# Patient Record
Sex: MALE | URBAN - METROPOLITAN AREA
[De-identification: names, ages, dates, MRNs, and addresses within clinical notes are randomized per-mention and may not be internally consistent; named-entity substitution may affect disease eponyms.]

---

## 2017-07-21 PROBLEM — Z00.00 ENCOUNTER FOR PREVENTIVE HEALTH EXAMINATION: Status: ACTIVE | Noted: 2017-07-21

## 2021-05-20 ENCOUNTER — EMERGENCY (EMERGENCY)
Facility: HOSPITAL | Age: 41
LOS: 1 days | Discharge: HOME | End: 2021-05-20
Attending: STUDENT IN AN ORGANIZED HEALTH CARE EDUCATION/TRAINING PROGRAM | Admitting: STUDENT IN AN ORGANIZED HEALTH CARE EDUCATION/TRAINING PROGRAM
Payer: MEDICAID

## 2021-05-20 VITALS
RESPIRATION RATE: 14 BRPM | HEART RATE: 89 BPM | SYSTOLIC BLOOD PRESSURE: 120 MMHG | DIASTOLIC BLOOD PRESSURE: 68 MMHG | TEMPERATURE: 98 F | OXYGEN SATURATION: 94 %

## 2021-05-20 VITALS
HEART RATE: 99 BPM | SYSTOLIC BLOOD PRESSURE: 136 MMHG | RESPIRATION RATE: 18 BRPM | TEMPERATURE: 98 F | OXYGEN SATURATION: 96 % | DIASTOLIC BLOOD PRESSURE: 86 MMHG

## 2021-05-20 PROCEDURE — 99284 EMERGENCY DEPT VISIT MOD MDM: CPT

## 2021-05-20 NOTE — ED PROVIDER NOTE - OBJECTIVE STATEMENT
40M presents to ED [facility] s/p 8mg narcan. Patient is inpatient [facility] for opoid detox on methadone. pt states he snuck in 3 bags of heroin while at a medical doctors appointment. Patient states he normally can snort much more heroin with less effect so he believes this was laced with fentanyl. pt c/o fatigue. no nausea. requesting food. no diff breathing. no si. no hi. no avh.

## 2021-05-20 NOTE — ED ADULT NURSE NOTE - CHIEF COMPLAINT QUOTE
pt brought in by ambulance from [facility] where he is being treated for substance abuse. was found to be lethargic but arouse able. pt admitted to using heroine. pt received 8mg of narcan IM. followed with one episode of vomiting. pt awake and alert in triage.

## 2021-05-20 NOTE — ED PROVIDER NOTE - NS ED MD DISPO DISCHARGE CCDA
Patient/Caregiver provided printed discharge information. Hatchet Flap Text: The defect edges were debeveled with a #15 scalpel blade.  Given the location of the defect, shape of the defect and the proximity to free margins a hatchet flap was deemed most appropriate.  Using a sterile surgical marker, an appropriate hatchet flap was drawn incorporating the defect and placing the expected incisions within the relaxed skin tension lines where possible.    The area thus outlined was incised deep to adipose tissue with a #15 scalpel blade.  The skin margins were undermined to an appropriate distance in all directions utilizing iris scissors.

## 2021-05-20 NOTE — ED ADULT NURSE NOTE - OBJECTIVE STATEMENT
Pt brought in by EMS received to 20A. Pt A&Ox4, ambulatory at baseline. Pt states that he used "3 bags of heroin around 7PM." Pt states that he had an overdose. As per triage note, pt received 8mg of narcan IM with EMS. Pt awake, slightly drowsy, calm and cooperative at this time. Pt states that he currently feels fine, without complaints. Respirations equal and unlabored, no acute distress noted. Pt sating between 90%-95% on room air. Denies chest pain, palpitations, SOB, fever, cough, chills, N/V/D, headaches, dizziness, recent sick contacts. Denies SI/HI, hallucinations. Cardiac monitor in place, NSR noted. Vital signs as noted, comfort measures provided, call bell within reach. Assessment ongoing.

## 2021-05-20 NOTE — ED PROVIDER NOTE - PATIENT PORTAL LINK FT
You can access the FollowMyHealth Patient Portal offered by [facility] by registering at the following website: [email or web address]. By joining [facility]’s FollowMyHealth portal, you will also be able to view your health information using other applications (apps) compatible with our system.

## 2021-05-20 NOTE — ED ADULT TRIAGE NOTE - CHIEF COMPLAINT QUOTE
pt brought in by ambulance from [facility] where he is being treated for substance abuse. was found to be lethargic but arouse able. pt admitted to using heroine. pt received 8mg of narcan IM. followed with one episode of vomiting. pt awake and alert in triage.

## 2021-05-20 NOTE — ED PROVIDER NOTE - PROGRESS NOTE DETAILS
Josue - pt reassessed. he is eating a food tray. he is not somnolent. SPO2 is fluctuating between 88% and 92%, so it is possible there is still opoid onboard. will observe for another hour. Josue - pt reassessed. he wants to go home. he feels well. spo2 93% to 97%. he is playing on his phone. will dc. social work consulted for dc planning.

## 2021-05-20 NOTE — ED PROVIDER NOTE - NSFOLLOWUPINSTRUCTIONS_ED_ALL_ED_FT
You were seen for heroin abuse.    Seek immediate medical assistance for any new or worsening symptoms.     Return to [facility] inpatient detox program.     Do not use heroin.

## 2021-05-20 NOTE — ED PROVIDER NOTE - CLINICAL SUMMARY MEDICAL DECISION MAKING FREE TEXT BOX
Josue - pt presents s/p narcan admin after snorting 3 bags of heroin. Patient was unconscious and awake after Narcan. vss. transiently hypoxic. pe unremarkable. will assess cxr, labs, observe 6 hours, reassess.

## 2021-05-20 NOTE — ED PROVIDER NOTE - ATTENDING CONTRIBUTION TO CARE
40M with pmh heroin abuse, bipolar, HTN presenting with heroin use s/p narcan. Patient found in [facility] unresponsive with EMS administering 8mg Narcan with patient waking up. Patient states he relapsed after sneaking in heroin today after going out for physical therapy. Denies any other drug use. Denies any acute complaints. Denies fever, chills, cp, sob, nausea, vomiting, diarrhea, dysuria, headache, weakness, numbness    GEN: NAD, awake, well appearing  HEENT: NCAT, MMM, normal conjunctiva, perrl  CHEST/LUNGS: Non-tachypneic, CTAB, bilateral breath sounds  CARDIAC: Non-tachycardic, s1s2, normal perfusion, no peripheral edema  ABDOMEN: Soft, NTND, No rebound/guarding  MSK: No joint tenderness, no gross deformity of extremities  SKIN: No rashes, no petechiae, no vesicles  NEURO: CN grossly intact, normal coordination, no focal motor or sensory deficits  PSYCH: Alert, appropriate, cooperative    Patient presenting s/p narcan reversal of heroin on board. Patient with benign exam, well appearing. Will observe given recent narcan administration for any respiratory depression. No signs of trauma. Exam unremarkable, vitals wnl.

## 2021-05-21 PROCEDURE — 71046 X-RAY EXAM CHEST 2 VIEWS: CPT | Mod: 26

## 2021-05-21 NOTE — PROVIDER CONTACT NOTE (OTHER) - ASSESSMENT
Called the program at [phone], spoke with staff member, Agustin, who confirmed that pt can return to the program.  Reports he is independent in travel and can travel via public transport or taxi.  Discussed with pt, as well as provider, pt agreeable to public transport and has knowledge of bus route.

## 2021-05-21 NOTE — PROVIDER CONTACT NOTE (OTHER) - SITUATION
Notified by provider that pt is ready for d/c, is returning to the [facility] at [facility].

## 2025-05-12 PROBLEM — F11.10 OPIOID ABUSE, UNCOMPLICATED: Chronic | Status: ACTIVE | Noted: 2021-05-20

## 2025-05-12 PROBLEM — I10 ESSENTIAL (PRIMARY) HYPERTENSION: Chronic | Status: ACTIVE | Noted: 2021-05-20

## 2025-05-12 PROBLEM — F31.9 BIPOLAR DISORDER, UNSPECIFIED: Chronic | Status: ACTIVE | Noted: 2021-05-20

## 2025-05-27 ENCOUNTER — NON-APPOINTMENT (OUTPATIENT)
Age: 45
End: 2025-05-27

## 2025-05-29 ENCOUNTER — APPOINTMENT (OUTPATIENT)
Dept: SURGERY | Facility: CLINIC | Age: 45
End: 2025-05-29
Payer: MEDICAID

## 2025-05-29 VITALS
OXYGEN SATURATION: 99 % | BODY MASS INDEX: 25.92 KG/M2 | HEART RATE: 62 BPM | WEIGHT: 173 LBS | SYSTOLIC BLOOD PRESSURE: 125 MMHG | HEIGHT: 68.5 IN | DIASTOLIC BLOOD PRESSURE: 81 MMHG

## 2025-05-29 DIAGNOSIS — Z82.49 FAMILY HISTORY OF ISCHEMIC HEART DISEASE AND OTHER DISEASES OF THE CIRCULATORY SYSTEM: ICD-10-CM

## 2025-05-29 DIAGNOSIS — F17.200 NICOTINE DEPENDENCE, UNSPECIFIED, UNCOMPLICATED: ICD-10-CM

## 2025-05-29 DIAGNOSIS — Z87.19 PERSONAL HISTORY OF OTHER DISEASES OF THE DIGESTIVE SYSTEM: ICD-10-CM

## 2025-05-29 DIAGNOSIS — K40.90 UNILATERAL INGUINAL HERNIA, W/OUT OBSTRUCTION OR GANGRENE, NOT SPECIFIED AS RECURRENT: ICD-10-CM

## 2025-05-29 PROCEDURE — 99203 OFFICE O/P NEW LOW 30 MIN: CPT

## 2025-05-29 RX ORDER — PANTOPRAZOLE 40 MG/1
40 TABLET, DELAYED RELEASE ORAL
Refills: 0 | Status: ACTIVE | COMMUNITY

## 2025-06-09 NOTE — PRE PROCEDURE NOTE - NSPROCASSESMENT_GEN_ALL_CORE
H/P referenced above, reviewed by presurgical testing and compiled here for day of surgery review and attestation by the surgical team.

## 2025-06-09 NOTE — PRE PROCEDURE NOTE - PRE PROCEDURE EVALUATION
Name:MAMI ELIASMRN:[id number] Date:May 29 2025 11:00AMDOB:[id number] By:KEISHA PICHARDOocumented Status:FinalDear Dr. Bello Carbone , I had the pleasure of evaluating your patient, MAMI ELIAS.Please see my note below. Thank you very much for allowing me to participate in the care of this patient. If you have any questions, please donot hesitate to contact me. Sincerely, Cale Pichardo MD, FACS Reason For VisitJSARA ELIAS is a 44 year old male being seen for a consultation visit. History of Present IllnessMr. MAMI ELIAS is a 44 year old male who was referred by Dr. Bello Carbone with the chief complaint of having pain and swelling in his Right groin. He has had the condition for 1.5 months and is worse when he is standing. He is stating that the swelling is getting bigger and symptomatic. He denies any fever or night sweats. Appetite is good and weight is stable. Active ProblemsHernia, inguinal, right (550.90) (K40.90)Past Medical HistoryHistory of gastritis (V12.79) (Z87.19)Surgical HistoryHistory of Knee surgeryFamily HistoryFamily history of hypertension (V17.49) (Z82.49) : Mother, FatherSocial HistoryCurrent smoker (305.1) (F17.200)Currently workingHas 2 childrenOccasional alcohol useSingleCurrent Meds      Pantoprazole Sodium 40 MG Oral Tablet Delayed ReleaseAllergiesNo Known Drug AllergiesReview of SystemsConstitutional, Eyes, ENT, Cardiovascular, Respiratory, Gastrointestinal, Genitourinary, Musculoskeletal, Integumentary, Neurological, Psychiatric, Endocrine and Heme/Lymph are otherwise negative. Data ReviewedNo studies available for review at this time. VitalsVital Signs Recorded: [id number]:28BFBpjvrfvr679, LUE, YtqlpncFxrxthkru26, LUE, SittingHeight5 ft 8.5 slXllbyi956 lb BMI Jkihymawzs21.92 kg/m2BSA Calculated1.93 d8Ybyzg Nhlz84F9 Xlbywcbugp34 %, Room AirFiO2 Flow Rate0 L/min, Room AirPain Assessment: The patient describes the pain as No pain now. Physical ExamGeneral Appearance: He is alert, well-groomed, and in NAD. HEENT: anicteric. Nasal mucosa pink, septum midline. Oral mucosa pink. Tongue midline, Pharynx without exudates. Neck: Neck supple. Trachea midline. Thyroid isthmus barely palpable, lobes not felt. Gastrointestinal:. reducible Right inguinal hernia. No evidence of hernia on the opposite side. No penile discharge or lesions. No scrotal swelling or discoloration. Testes descended bilaterally, smooth, without masses. Epididymis non-tender. Neurologic: alert,oriented to person,oriented to placeandoriented to time. Psychiatric: calm. AssessmentHernia, inguinal, right (550.90) (K40.90)Impression: reducible Right inguinal hernia. PlanHernia, inguinal, righSurgery Booking Form Outpatient    .    Status: Need Information - Required information Requested for: [id number] the patient receive COVID 19 vaccine? : Yes, Patient statedERP (Enhanced Recovery Program) : NoMedical Evaluation : YesPST Triage Evaluation : PST ApptAdmission Type : OPAnesthesia Alert : NAnesthesia Type : IVASLaterality : RightLength of Procedure : 1 hourProcedure Description: : right inguinal hernia repair with meshSocHx: Current smoker Tobacco Use Screening; Status:Complete;      Done: [id number] Use Screening; Status:Complete;      Done: 67Dtl4727Jq. ELIAS was told significance of findings, options, risks and benefits were explained. Informed consent for rightinguinal hernia repair with mesh , and potential risks, benefits and alternatives (surgical options were discussed including non-surgical options or the option of no surgery) to the planned surgery were discussed in depth. All surgical options were discussed including non-surgical treatments. He wishes to proceed with surgery. We will plan for surgery on at the next available date, pending any required insurance pre-certification or pre-approval. He agrees to obtain any necessary pre-operative evaluations and testing prior to surgery.Patient advised to seek immediate medical attention with any acute change in symptoms or with the development of any new or worsening symptoms. Patient's questions and concerns addressed to patient's satisfaction, and patient verbalized an understanding of the information discussed.  Electronically signed by : CALE PICHARDO MD; May 29 2025 11:48AM Eastern Standard Time (Author)

## 2025-06-11 ENCOUNTER — TRANSCRIPTION ENCOUNTER (OUTPATIENT)
Age: 45
End: 2025-06-11

## 2025-06-11 ENCOUNTER — OUTPATIENT (OUTPATIENT)
Dept: OUTPATIENT SERVICES | Facility: HOSPITAL | Age: 45
LOS: 1 days | End: 2025-06-11
Payer: MEDICAID

## 2025-06-11 ENCOUNTER — APPOINTMENT (OUTPATIENT)
Dept: SURGERY | Facility: HOSPITAL | Age: 45
End: 2025-06-11

## 2025-06-11 VITALS
DIASTOLIC BLOOD PRESSURE: 81 MMHG | SYSTOLIC BLOOD PRESSURE: 135 MMHG | HEART RATE: 70 BPM | TEMPERATURE: 98 F | OXYGEN SATURATION: 100 % | RESPIRATION RATE: 16 BRPM

## 2025-06-11 VITALS
HEART RATE: 69 BPM | RESPIRATION RATE: 16 BRPM | TEMPERATURE: 98 F | SYSTOLIC BLOOD PRESSURE: 125 MMHG | HEIGHT: 68 IN | WEIGHT: 173.06 LBS | OXYGEN SATURATION: 98 % | DIASTOLIC BLOOD PRESSURE: 87 MMHG

## 2025-06-11 DIAGNOSIS — K40.90 UNILATERAL INGUINAL HERNIA, WITHOUT OBSTRUCTION OR GANGRENE, NOT SPECIFIED AS RECURRENT: ICD-10-CM

## 2025-06-11 PROCEDURE — 49505 PRP I/HERN INIT REDUC >5 YR: CPT | Mod: 1L,AS,RT

## 2025-06-11 PROCEDURE — 49505 PRP I/HERN INIT REDUC >5 YR: CPT | Mod: RT

## 2025-06-11 PROCEDURE — C1781: CPT

## 2025-06-11 PROCEDURE — 49505 PRP I/HERN INIT REDUC >5 YR: CPT

## 2025-06-11 DEVICE — MESH HERNIA MARLEX 3 X 6": Type: IMPLANTABLE DEVICE | Status: FUNCTIONAL

## 2025-06-11 RX ORDER — ACETAMINOPHEN 500 MG/5ML
1000 LIQUID (ML) ORAL ONCE
Refills: 0 | Status: COMPLETED | OUTPATIENT
Start: 2025-06-11 | End: 2025-06-11

## 2025-06-11 RX ORDER — ONDANSETRON HCL/PF 4 MG/2 ML
4 VIAL (ML) INJECTION ONCE
Refills: 0 | Status: DISCONTINUED | OUTPATIENT
Start: 2025-06-11 | End: 2025-06-11

## 2025-06-11 RX ORDER — FENTANYL CITRATE-0.9 % NACL/PF 100MCG/2ML
25 SYRINGE (ML) INTRAVENOUS
Refills: 0 | Status: DISCONTINUED | OUTPATIENT
Start: 2025-06-11 | End: 2025-06-11

## 2025-06-11 RX ORDER — FENTANYL CITRATE-0.9 % NACL/PF 100MCG/2ML
50 SYRINGE (ML) INTRAVENOUS
Refills: 0 | Status: DISCONTINUED | OUTPATIENT
Start: 2025-06-11 | End: 2025-06-11

## 2025-06-11 RX ADMIN — Medication 1 APPLICATION(S): at 13:16

## 2025-06-11 RX ADMIN — Medication 400 MILLIGRAM(S): at 16:45

## 2025-06-11 NOTE — ASU DISCHARGE PLAN (ADULT/PEDIATRIC) - CARE PROVIDER_API CALL
Cale Pichardo  Surgery  [address], [address]  [place] [address]  Phone: [phone]  Fax: [phone]  Follow Up Time:

## 2025-06-11 NOTE — ASU DISCHARGE PLAN (ADULT/PEDIATRIC) - FINANCIAL ASSISTANCE
[facility] provides services at a reduced cost to those who are determined to be eligible through [facility]’s financial assistance program. Information regarding [facility]’s financial assistance program can be found by going to [email or web address] or by calling [phone].

## 2025-06-11 NOTE — ASU PATIENT PROFILE, ADULT - NSCAGESTDRUGMD_GEN_A_CORE_SD
dr. perez (anesthes.) and ASU OSVALDO Wellington (charge nurse)/Name of MD Notified: (Please Specify)

## 2025-06-11 NOTE — CHART NOTE - NSCHARTNOTEFT_GEN_A_CORE
44yoM scheduled for right inguinal hernia repair. Admits to heroin use relapse and last use yesterday. Counseled patient on importance of avoiding concurrent heroin use with pain medication and potential synergistic adverse effects including respiratory depression and death. Pt is AOx4, no signs of intoxication or withdrawal, expresses understanding. Pt had been referred to substance use programs in past and has information/resources for such programs.

## 2025-06-11 NOTE — ASU PREOP CHECKLIST - 2.
pt is OK to proceed with surgery as per Dr Rae and dr Pichardo. pt will be reffered to OP services to pain management by Dr Rae. Dr rae will be contacting [facility] pain management today for referal.OR Mary Samaniego is aware

## 2025-06-12 RX ORDER — KETOROLAC TROMETHAMINE 30 MG/ML
1 INJECTION, SOLUTION INTRAMUSCULAR; INTRAVENOUS
Qty: 20 | Refills: 0
Start: 2025-06-12 | End: 2025-06-16

## 2025-06-17 RX ORDER — IBUPROFEN 600 MG/1
600 TABLET, FILM COATED ORAL EVERY 6 HOURS
Qty: 40 | Refills: 0 | Status: ACTIVE | COMMUNITY
Start: 2025-06-17 | End: 1900-01-01

## 2025-06-20 ENCOUNTER — INPATIENT (INPATIENT)
Facility: HOSPITAL | Age: 45
LOS: 4 days | Discharge: HOME | DRG: 857 | End: 2025-06-25
Attending: SPECIALIST | Admitting: SPECIALIST
Payer: MEDICAID

## 2025-06-20 VITALS
TEMPERATURE: 98 F | DIASTOLIC BLOOD PRESSURE: 65 MMHG | OXYGEN SATURATION: 96 % | SYSTOLIC BLOOD PRESSURE: 94 MMHG | WEIGHT: 181 LBS | RESPIRATION RATE: 16 BRPM | HEIGHT: 68 IN | HEART RATE: 97 BPM

## 2025-06-20 DIAGNOSIS — R19.00 INTRA-ABDOMINAL AND PELVIC SWELLING, MASS AND LUMP, UNSPECIFIED SITE: ICD-10-CM

## 2025-06-20 LAB
ALBUMIN SERPL ELPH-MCNC: 2.5 G/DL — LOW (ref 3.5–5)
ALP SERPL-CCNC: 103 U/L — SIGNIFICANT CHANGE UP (ref 40–120)
ALT FLD-CCNC: 16 U/L DA — SIGNIFICANT CHANGE UP (ref 10–60)
ANION GAP SERPL CALC-SCNC: 5 MMOL/L — SIGNIFICANT CHANGE UP (ref 5–17)
ANISOCYTOSIS BLD QL: SLIGHT — SIGNIFICANT CHANGE UP
APTT BLD: 27.5 SEC — SIGNIFICANT CHANGE UP (ref 26.1–36.8)
AST SERPL-CCNC: 10 U/L — SIGNIFICANT CHANGE UP (ref 10–40)
BASOPHILS # BLD AUTO: 0 K/UL — SIGNIFICANT CHANGE UP (ref 0–0.2)
BASOPHILS NFR BLD AUTO: 0 % — SIGNIFICANT CHANGE UP (ref 0–2)
BILIRUB SERPL-MCNC: 0.7 MG/DL — SIGNIFICANT CHANGE UP (ref 0.2–1.2)
BUN SERPL-MCNC: 10 MG/DL — SIGNIFICANT CHANGE UP (ref 7–18)
CALCIUM SERPL-MCNC: 8.8 MG/DL — SIGNIFICANT CHANGE UP (ref 8.4–10.5)
CHLORIDE SERPL-SCNC: 98 MMOL/L — SIGNIFICANT CHANGE UP (ref 96–108)
CO2 SERPL-SCNC: 27 MMOL/L — SIGNIFICANT CHANGE UP (ref 22–31)
CREAT SERPL-MCNC: 0.89 MG/DL — SIGNIFICANT CHANGE UP (ref 0.5–1.3)
EGFR: 107 ML/MIN/1.73M2 — SIGNIFICANT CHANGE UP
EGFR: 107 ML/MIN/1.73M2 — SIGNIFICANT CHANGE UP
EOSINOPHIL # BLD AUTO: 0.31 K/UL — SIGNIFICANT CHANGE UP (ref 0–0.5)
EOSINOPHIL NFR BLD AUTO: 1 % — SIGNIFICANT CHANGE UP (ref 0–6)
GLUCOSE SERPL-MCNC: 99 MG/DL — SIGNIFICANT CHANGE UP (ref 70–99)
HCT VFR BLD CALC: 36.6 % — LOW (ref 39–50)
HGB BLD-MCNC: 12.5 G/DL — LOW (ref 13–17)
INR BLD: 1.34 RATIO — HIGH (ref 0.85–1.16)
LACTATE SERPL-SCNC: 1 MMOL/L — SIGNIFICANT CHANGE UP (ref 0.7–2)
LG PLATELETS BLD QL AUTO: SLIGHT — SIGNIFICANT CHANGE UP
LYMPHOCYTES # BLD AUTO: 1.24 K/UL — SIGNIFICANT CHANGE UP (ref 1–3.3)
LYMPHOCYTES # BLD AUTO: 4 % — LOW (ref 13–44)
MANUAL SMEAR VERIFICATION: SIGNIFICANT CHANGE UP
MCHC RBC-ENTMCNC: 29.2 PG — SIGNIFICANT CHANGE UP (ref 27–34)
MCHC RBC-ENTMCNC: 34.2 G/DL — SIGNIFICANT CHANGE UP (ref 32–36)
MCV RBC AUTO: 85.5 FL — SIGNIFICANT CHANGE UP (ref 80–100)
MICROCYTES BLD QL: SLIGHT — SIGNIFICANT CHANGE UP
MONOCYTES # BLD AUTO: 1.24 K/UL — HIGH (ref 0–0.9)
MONOCYTES NFR BLD AUTO: 4 % — SIGNIFICANT CHANGE UP (ref 2–14)
NEUTROPHILS # BLD AUTO: 28.21 K/UL — HIGH (ref 1.8–7.4)
NEUTROPHILS NFR BLD AUTO: 85 % — HIGH (ref 43–77)
NEUTS BAND # BLD: 6 % — SIGNIFICANT CHANGE UP (ref 0–8)
NEUTS BAND NFR BLD: 6 % — SIGNIFICANT CHANGE UP (ref 0–8)
NRBC # BLD: 0 /100 WBCS — SIGNIFICANT CHANGE UP (ref 0–0)
NRBC BLD-RTO: 0 /100 WBCS — SIGNIFICANT CHANGE UP (ref 0–0)
PLAT MORPH BLD: NORMAL — SIGNIFICANT CHANGE UP
PLATELET # BLD AUTO: 394 K/UL — SIGNIFICANT CHANGE UP (ref 150–400)
POIKILOCYTOSIS BLD QL AUTO: SLIGHT — SIGNIFICANT CHANGE UP
POTASSIUM SERPL-MCNC: 3.5 MMOL/L — SIGNIFICANT CHANGE UP (ref 3.5–5.3)
POTASSIUM SERPL-SCNC: 3.5 MMOL/L — SIGNIFICANT CHANGE UP (ref 3.5–5.3)
PROT SERPL-MCNC: 7 G/DL — SIGNIFICANT CHANGE UP (ref 6–8.3)
PROTHROM AB SERPL-ACNC: 15.6 SEC — HIGH (ref 9.9–13.4)
RBC # BLD: 4.28 M/UL — SIGNIFICANT CHANGE UP (ref 4.2–5.8)
RBC # FLD: 13.8 % — SIGNIFICANT CHANGE UP (ref 10.3–14.5)
RBC BLD AUTO: ABNORMAL
SODIUM SERPL-SCNC: 130 MMOL/L — LOW (ref 135–145)
SPHEROCYTES BLD QL SMEAR: SLIGHT — SIGNIFICANT CHANGE UP
WBC # BLD: 31 K/UL — HIGH (ref 3.8–10.5)
WBC # FLD AUTO: 31 K/UL — HIGH (ref 3.8–10.5)

## 2025-06-20 PROCEDURE — 74177 CT ABD & PELVIS W/CONTRAST: CPT | Mod: 26

## 2025-06-20 PROCEDURE — 99285 EMERGENCY DEPT VISIT HI MDM: CPT

## 2025-06-20 RX ORDER — ACETAMINOPHEN 500 MG/5ML
650 LIQUID (ML) ORAL EVERY 6 HOURS
Refills: 0 | Status: DISCONTINUED | OUTPATIENT
Start: 2025-06-20 | End: 2025-06-20

## 2025-06-20 RX ORDER — ONDANSETRON HCL/PF 4 MG/2 ML
4 VIAL (ML) INJECTION EVERY 6 HOURS
Refills: 0 | Status: DISCONTINUED | OUTPATIENT
Start: 2025-06-20 | End: 2025-06-25

## 2025-06-20 RX ORDER — SODIUM CHLORIDE 9 G/1000ML
500 INJECTION, SOLUTION INTRAVENOUS
Refills: 0 | Status: DISCONTINUED | OUTPATIENT
Start: 2025-06-20 | End: 2025-06-21

## 2025-06-20 RX ORDER — VANCOMYCIN HCL IN 5 % DEXTROSE 1.5G/250ML
1000 PLASTIC BAG, INJECTION (ML) INTRAVENOUS EVERY 12 HOURS
Refills: 0 | Status: COMPLETED | OUTPATIENT
Start: 2025-06-21 | End: 2025-06-21

## 2025-06-20 RX ORDER — PIPERACILLIN-TAZO-DEXTROSE,ISO 3.375G/5
3.38 IV SOLUTION, PIGGYBACK PREMIX FROZEN(ML) INTRAVENOUS ONCE
Refills: 0 | Status: COMPLETED | OUTPATIENT
Start: 2025-06-20 | End: 2025-06-20

## 2025-06-20 RX ORDER — SODIUM CHLORIDE 9 G/1000ML
1000 INJECTION, SOLUTION INTRAVENOUS
Refills: 0 | Status: DISCONTINUED | OUTPATIENT
Start: 2025-06-20 | End: 2025-06-21

## 2025-06-20 RX ORDER — HEPARIN SODIUM 1000 [USP'U]/ML
5000 INJECTION INTRAVENOUS; SUBCUTANEOUS EVERY 8 HOURS
Refills: 0 | Status: DISCONTINUED | OUTPATIENT
Start: 2025-06-20 | End: 2025-06-25

## 2025-06-20 RX ORDER — KETOROLAC TROMETHAMINE 30 MG/ML
15 INJECTION, SOLUTION INTRAMUSCULAR; INTRAVENOUS EVERY 6 HOURS
Refills: 0 | Status: DISCONTINUED | OUTPATIENT
Start: 2025-06-20 | End: 2025-06-24

## 2025-06-20 RX ORDER — VANCOMYCIN HCL IN 5 % DEXTROSE 1.5G/250ML
PLASTIC BAG, INJECTION (ML) INTRAVENOUS
Refills: 0 | Status: COMPLETED | OUTPATIENT
Start: 2025-06-20 | End: 2025-06-21

## 2025-06-20 RX ORDER — ACETAMINOPHEN 500 MG/5ML
1000 LIQUID (ML) ORAL EVERY 6 HOURS
Refills: 0 | Status: DISCONTINUED | OUTPATIENT
Start: 2025-06-20 | End: 2025-06-25

## 2025-06-20 RX ORDER — KETOROLAC TROMETHAMINE 30 MG/ML
15 INJECTION, SOLUTION INTRAMUSCULAR; INTRAVENOUS ONCE
Refills: 0 | Status: DISCONTINUED | OUTPATIENT
Start: 2025-06-20 | End: 2025-06-20

## 2025-06-20 RX ORDER — VANCOMYCIN HCL IN 5 % DEXTROSE 1.5G/250ML
1000 PLASTIC BAG, INJECTION (ML) INTRAVENOUS ONCE
Refills: 0 | Status: COMPLETED | OUTPATIENT
Start: 2025-06-20 | End: 2025-06-20

## 2025-06-20 RX ADMIN — SODIUM CHLORIDE 1000 MILLILITER(S): 9 INJECTION, SOLUTION INTRAVENOUS at 23:54

## 2025-06-20 RX ADMIN — Medication 650 MILLIGRAM(S): at 21:55

## 2025-06-20 RX ADMIN — KETOROLAC TROMETHAMINE 15 MILLIGRAM(S): 30 INJECTION, SOLUTION INTRAMUSCULAR; INTRAVENOUS at 19:55

## 2025-06-20 RX ADMIN — Medication 200 GRAM(S): at 19:50

## 2025-06-20 RX ADMIN — Medication 650 MILLIGRAM(S): at 22:56

## 2025-06-20 RX ADMIN — SODIUM CHLORIDE 120 MILLILITER(S): 9 INJECTION, SOLUTION INTRAVENOUS at 19:50

## 2025-06-20 RX ADMIN — HEPARIN SODIUM 5000 UNIT(S): 1000 INJECTION INTRAVENOUS; SUBCUTANEOUS at 21:56

## 2025-06-20 RX ADMIN — Medication 250 MILLIGRAM(S): at 20:45

## 2025-06-20 NOTE — ED ADULT TRIAGE NOTE - PAIN RATING/NUMBER SCALE (0-10): ACTIVITY
Pravastatin 40mg tab, 1 tab daily in PM  Restart Losartan for blood pressure control.  Continue other medications  Fasting labs in 4-6 weeks for cholesterol, liver. Call earlier  If side effects with med  Nonfasting kidney lab mid July. See me a few days later to review  results and blood pressure control back on Losartan     10 (severe pain)

## 2025-06-20 NOTE — CONSULT NOTE ADULT - SUBJECTIVE AND OBJECTIVE BOX
Patient is a 44y old  Male who presents with a chief complaint of     HPI:      Called see and eval 44y.o. Male w/PMH significant for 44 yr old male with hx of heroin use, HTN consulted for  right scrotal swelling, redness and pain x 4 days. had hernia repair with dr gordon 6/11/25. Endores having BM Denies fevers, chills, n/v/d, dyurisa,      PAST MEDICAL & SURGICAL HISTORY:  HTN (hypertension)      Heroin abuse      Bipolar affective disorder          MEDICATIONS  (STANDING):    MEDICATIONS  (PRN):      Allergies    No Known Allergies    Intolerances        Vital Signs Last 24 Hrs  T(C): 36.8 (20 Jun 2025 15:29), Max: 36.8 (20 Jun 2025 15:29)  T(F): 98.2 (20 Jun 2025 15:29), Max: 98.2 (20 Jun 2025 15:29)  HR: 97 (20 Jun 2025 15:29) (97 - 97)  BP: 94/65 (20 Jun 2025 15:29) (94/65 - 94/65)  BP(mean): --  RR: 16 (20 Jun 2025 15:29) (16 - 16)  SpO2: 96% (20 Jun 2025 15:29) (96% - 96%)    Parameters below as of 20 Jun 2025 15:29  Patient On (Oxygen Delivery Method): room air        Physical:  General: AAOx3, No acute distress  HEENT: NC/AT, trachea midline  Respiratory: in no respiraotry distress  Skin: No jaundice, no icterus  Abdomen: soft, nontender, no palpable mass, no rigidity, no guarding  Groin: incision c/d/i with steri strips. red with swelling.  : swollen, erythematous penis and testicles. TTP on R testicle    I&O's Detail      LABS:                        12.5   31.00 )-----------( 394      ( 20 Jun 2025 17:20 )             36.6                                  RADIOLOGY & ADDITIONAL STUDIES:    44y.o. Male with Called see and bear 44y.o. Male w/PMH significant for 44 yr old male with hx of heroin use, HTN consulted for  right scrotal swelling, redness and pain x 4 days. had hernia repair with dr gordon 6/11/25. Endores having BM Denies fevers, chills, n/v/d, dyurisa,      PAST MEDICAL & SURGICAL HISTORY:  HTN (hypertension)  Heroin abuse  Bipolar affective disorder          MEDICATIONS  (STANDING):    MEDICATIONS  (PRN):      Allergies    No Known Allergies    Intolerances        Vital Signs Last 24 Hrs  T(C): 36.8 (20 Jun 2025 15:29), Max: 36.8 (20 Jun 2025 15:29)  T(F): 98.2 (20 Jun 2025 15:29), Max: 98.2 (20 Jun 2025 15:29)  HR: 97 (20 Jun 2025 15:29) (97 - 97)  BP: 94/65 (20 Jun 2025 15:29) (94/65 - 94/65)  BP(mean): --  RR: 16 (20 Jun 2025 15:29) (16 - 16)  SpO2: 96% (20 Jun 2025 15:29) (96% - 96%)    Parameters below as of 20 Jun 2025 15:29  Patient On (Oxygen Delivery Method): room air        Physical:  General: AAOx3, No acute distress  HEENT: NC/AT, trachea midline  Respiratory: in no respiratory distress  Skin: No jaundice, no icterus  Abdomen: soft, nontender, no palpable mass, no rigidity, no guarding  Groin: incision with steri strips. red with swelling.  : swollen, erythematous penis and testicles. TTP on R testicle    I&O's Detail      LABS:                        12.5   31.00 )-----------( 394      ( 20 Jun 2025 17:20 )             36.6                                  RADIOLOGY & ADDITIONAL STUDIES:  < from: CT Abdomen and Pelvis w/ IV Cont (06.20.25 @ 18:00) >  PROCEDURE DATE:  06/20/2025          INTERPRETATION:  CLINICAL INFORMATION: hernia HCT    COMPARISON: None.    CONTRAST/COMPLICATIONS:  IV Contrast: Omnipaque 350  90 cc administered   10 cc discarded  Oral Contrast: NONE.    PROCEDURE:  CT of the Abdomen and Pelvis was performed.  Sagittal and coronal reformats were performed.    FINDINGS:    LOWER CHEST: Within normal limits.    LIVER: Cysts and other lesions too small tocharacterize.  BILE DUCTS: Normal caliber.  GALLBLADDER: Within normal limits.  SPLEEN: Within normal limits.  PANCREAS: Within normal limits.  ADRENALS: Within normal limits.  KIDNEYS/URETERS: Within normal limits.    BLADDER: Within normal limits.  REPRODUCTIVE ORGANS: A large right hydrocele. Scrotal thickening.    BOWEL: No bowel obstruction. The appendix is normal.  PERITONEUM/RETROPERITONEUM: Within normal limits.  VESSELS:  Within normal limits.  LYMPH NODES: Within normal limits.  ABDOMINAL WALL: Focal soft tissue defect lower abdominal wall. Extensive   soft tissue infiltration and skin thickening in the lower abdominal wall   extending into the groin. Prominent inguinal lymph nodes. Patient appears   to be status post right inguinalhernia repair given the history.  BONES: Within normal limits.    IMPRESSION: Extensive soft tissue infiltration and skin thickening of the   lower abdominal wall extending into the groin and scrotum. Please   correlate for cellulitis.    Focal soft tissue defect in the lower abdominal wall.    Large right hydrocele.    < end of copied text >

## 2025-06-20 NOTE — ED ADULT NURSE NOTE - ED STAT RN HANDOFF DETAILS
Patient is alert and oriented x3. Patient is medicated for pain. No distress noted. Safety maintained. Endorsed to OSVALDO Sheikh.

## 2025-06-20 NOTE — ED ADULT NURSE NOTE - OBJECTIVE STATEMENT
Patient presents to ED with c/o right scrotal redness, pain, and swelling for 4 days. Patient reports right inguinal hernia repair on 6/11 with increased bloody discharge from incision site. Steri-strip noted.

## 2025-06-20 NOTE — ED PROVIDER NOTE - CLINICAL SUMMARY MEDICAL DECISION MAKING FREE TEXT BOX
44 yr old male with hx of heroin use, HTN presents to ed c/o right scrotal swelling, redness and pain x 4 days. had hernia repair with dr gordon 6/11/25. everything was ok until recently. normal Bm and urine.     likely cellulitis r/o abscess vs mesh complication? labs, ct, will give abx per surg rec. 44 yr old male with hx of heroin use, HTN presents to ed c/o right scrotal swelling, redness and pain x 4 days. had hernia repair with dr gordon 6/11/25. everything was ok until recently. normal Bm and urine.     likely cellulitis r/o abscess vs mesh complication? labs, ct, will give abx per surg rec.  pelvic swelling. ct done, high wbc- zosyn given. surg admit

## 2025-06-20 NOTE — ED ADULT NURSE NOTE - NSFALLRISKFACTORS_ED_ALL_ED
Occupational Therapy  Visit Type: treatment  Precautions:  Medical precautions:  fall risk;.   Lines:       Lines in chart and on patient reviewed, cautions maintained throughout session.  Hearing: no hearing deficits  Vision:     Current vision: no visual deficits  Safety Measures: bed alarm and bed rails    SUBJECTIVE                                                                                                            Patient agreed to participate in therapy this date.  The clavicle hurts like hell  Patient / Family Goal: return home    Pain     Location: R clavicle     At onset of session (out of 10): 5  RN informed on pain level      OBJECTIVE                                                                                                              Level of consciousness: alert    Oriented to person, place, time and situation     Affect/Behavior: calm and cooperative  Functional Communication/Cognition    Overall status:  Within functional limits  Balance  Standing - Firm Surface - Eyes Open:     Dynamic:  supervision    Bed mobility:      Supine to sit: minimal assist    Sit to supine: supervision  Transfers:    Assistive devices: gait belt    Sit to stand: supervision    Stand to sit: supervision  Activities of Daily Living (ADLs):  Grooming/Oral Hygiene:     Grooming assist: supervision and set up  Upper Body Dressing:    Assist: supervision and set up  Lower Body Dressing:     Assist: supervision and set up  Toilet transfer:     Assist: supervision  Toileting:     Assist: supervision      Interventions                                                                                                                 Training provided: ADL training and compensatory techniquesSplint check performed, no areas of redness, irritation; pt reports good comfort. Instructed in compensatory dressing techniques; pt demo'd good understanding with adequate standing balance to safely complete UB/LB dressing with 
[FreeTextEntry1] : Will get stat MRI to evaluate acute lumbar 1 compression fracture
set-up/supervision.   Skilled input: verbal instruction/cues, tactile instruction/cues and facilitation  Verbal Consent: Writer verbally educated and received verbal consent for hand placement, positioning of patient, and techniques to be performed today from patient for therapist position for techniques as described above and how they are pertinent to the patient's plan of care.        ASSESSMENT                                                                                                                Therapy Diagnosis:   Decreased ability to perform self care tasks and functional transfers.     Discharge Recommendations:   Recommendations for Discharge: OT IL: Patient requires intermittent nonskilled assistance to perform mobility and/or ADLs safely     PT/OT Mobility Equipment for Discharge: likely none  PT/OT ADL Equipment for Discharge: tbd      Pt progressing well; anticipate pt will have adequate assist as provided by family for a safe d/c home when medically appropriate  Progress: improving as expected    End of Session:   Location: in bed  Safety measures: call light within reach, equipment intact, lines intact and restraints in place  Handoff to: nurse    PLAN                                                                                                                          Suggestions for next session as indicated: OT Frequency: 5 days/week  Frequency Comments: d/c OT 10.10      Agreement to plan and goals: patient agrees with goals and treatment plan      GOALS:  Review Date: 10/10/2020  Long Term Goals: (to be met by time of discharge from hospital)  Grooming: Patient will complete grooming tasks in standing supervision.  Status: met   Toileting: Patient will complete toileting supervision.  Status: met   Toilet transfer: Patient will complete toilet transfer with supervision.  Status: met         Documented in the chart in the following areas: Assessment. Plan.      
No indicators present

## 2025-06-20 NOTE — ED PROVIDER NOTE - OBJECTIVE STATEMENT
44 yr old male with hx of heroin use, HTN presents to ed c/o right scrotal swelling, redness and pain x 4 days. had hernia repair with dr gordon 6/11/25. everything was ok until recently. normal Bm and urine.

## 2025-06-20 NOTE — ED ADULT TRIAGE NOTE - TEMPERATURE IN FAHRENHEIT (DEGREES F)
Name: ANTONIO CHRISTIANSON    Related interaction  [facility] IP Care Transitions (Call 1 (02D PD))     Questions     Question 1   General Status   Do you have any new or worsening symptoms since leaving the hospital? If yes please press 1, if no press 2, if you're not sure please press 3, or if you're feeling better press 4.   Unsure of Symptoms (Issue Panel: Clinical Intervention, Issue Alert: Clinical Alert    Clinical Concerns - Issues         Clinical Concerns - Issues List:     Other (Add Details in Comments)       Comments:     Pt has a fungal infection of her mouth and she was seen by rn in md office today.    Pt is taking her medications as prescribed     Required Interventions and Feedback     Call Status:     Answered, Attempt 2        98.2

## 2025-06-20 NOTE — ED PROVIDER NOTE - GASTROINTESTINAL, MLM
Abdomen soft, mid pelvic and right scrotal-tender with yellow discharge, steristep and incision line intact. + erythema. swollen, no guarding.

## 2025-06-20 NOTE — ED ADULT TRIAGE NOTE - CHIEF COMPLAINT QUOTE
Right inguinal hernia repair 6/11  with swelling getting and with watery bloody discharge from incision site worst for 3 days

## 2025-06-20 NOTE — PATIENT PROFILE ADULT - FALL HARM RISK - HARM RISK INTERVENTIONS

## 2025-06-21 DIAGNOSIS — F11.20 OPIOID DEPENDENCE, UNCOMPLICATED: ICD-10-CM

## 2025-06-21 DIAGNOSIS — I10 ESSENTIAL (PRIMARY) HYPERTENSION: ICD-10-CM

## 2025-06-21 DIAGNOSIS — L02.214 CUTANEOUS ABSCESS OF GROIN: ICD-10-CM

## 2025-06-21 LAB
ANION GAP SERPL CALC-SCNC: 1 MMOL/L — LOW (ref 5–17)
BUN SERPL-MCNC: 11 MG/DL — SIGNIFICANT CHANGE UP (ref 7–18)
CALCIUM SERPL-MCNC: 8.3 MG/DL — LOW (ref 8.4–10.5)
CHLORIDE SERPL-SCNC: 101 MMOL/L — SIGNIFICANT CHANGE UP (ref 96–108)
CO2 SERPL-SCNC: 30 MMOL/L — SIGNIFICANT CHANGE UP (ref 22–31)
CREAT SERPL-MCNC: 0.67 MG/DL — SIGNIFICANT CHANGE UP (ref 0.5–1.3)
EGFR: 117 ML/MIN/1.73M2 — SIGNIFICANT CHANGE UP
EGFR: 117 ML/MIN/1.73M2 — SIGNIFICANT CHANGE UP
GLUCOSE SERPL-MCNC: 96 MG/DL — SIGNIFICANT CHANGE UP (ref 70–99)
GRAM STN FLD: ABNORMAL
HCT VFR BLD CALC: 31.5 % — LOW (ref 39–50)
HGB BLD-MCNC: 11 G/DL — LOW (ref 13–17)
MCHC RBC-ENTMCNC: 29.7 PG — SIGNIFICANT CHANGE UP (ref 27–34)
MCHC RBC-ENTMCNC: 34.9 G/DL — SIGNIFICANT CHANGE UP (ref 32–36)
MCV RBC AUTO: 85.1 FL — SIGNIFICANT CHANGE UP (ref 80–100)
NRBC BLD AUTO-RTO: 0 /100 WBCS — SIGNIFICANT CHANGE UP (ref 0–0)
PLATELET # BLD AUTO: 360 K/UL — SIGNIFICANT CHANGE UP (ref 150–400)
POTASSIUM SERPL-MCNC: 3.1 MMOL/L — LOW (ref 3.5–5.3)
POTASSIUM SERPL-SCNC: 3.1 MMOL/L — LOW (ref 3.5–5.3)
RBC # BLD: 3.7 M/UL — LOW (ref 4.2–5.8)
RBC # FLD: 13.8 % — SIGNIFICANT CHANGE UP (ref 10.3–14.5)
SODIUM SERPL-SCNC: 132 MMOL/L — LOW (ref 135–145)
SPECIMEN SOURCE: SIGNIFICANT CHANGE UP
WBC # BLD: 28.21 K/UL — HIGH (ref 3.8–10.5)
WBC # FLD AUTO: 28.21 K/UL — HIGH (ref 3.8–10.5)

## 2025-06-21 RX ORDER — METHADONE HCL 10 MG
40 TABLET ORAL DAILY
Refills: 0 | Status: DISCONTINUED | OUTPATIENT
Start: 2025-06-21 | End: 2025-06-25

## 2025-06-21 RX ORDER — MELATONIN 5 MG
5 TABLET ORAL ONCE
Refills: 0 | Status: COMPLETED | OUTPATIENT
Start: 2025-06-21 | End: 2025-06-21

## 2025-06-21 RX ORDER — MELATONIN 5 MG
5 TABLET ORAL AT BEDTIME
Refills: 0 | Status: DISCONTINUED | OUTPATIENT
Start: 2025-06-21 | End: 2025-06-25

## 2025-06-21 RX ADMIN — Medication 5 MILLIGRAM(S): at 01:25

## 2025-06-21 RX ADMIN — KETOROLAC TROMETHAMINE 15 MILLIGRAM(S): 30 INJECTION, SOLUTION INTRAMUSCULAR; INTRAVENOUS at 11:08

## 2025-06-21 RX ADMIN — Medication 40 MILLIGRAM(S): at 11:06

## 2025-06-21 RX ADMIN — HEPARIN SODIUM 5000 UNIT(S): 1000 INJECTION INTRAVENOUS; SUBCUTANEOUS at 05:18

## 2025-06-21 RX ADMIN — KETOROLAC TROMETHAMINE 15 MILLIGRAM(S): 30 INJECTION, SOLUTION INTRAMUSCULAR; INTRAVENOUS at 20:15

## 2025-06-21 RX ADMIN — KETOROLAC TROMETHAMINE 15 MILLIGRAM(S): 30 INJECTION, SOLUTION INTRAMUSCULAR; INTRAVENOUS at 03:45

## 2025-06-21 RX ADMIN — KETOROLAC TROMETHAMINE 15 MILLIGRAM(S): 30 INJECTION, SOLUTION INTRAMUSCULAR; INTRAVENOUS at 02:42

## 2025-06-21 RX ADMIN — HEPARIN SODIUM 5000 UNIT(S): 1000 INJECTION INTRAVENOUS; SUBCUTANEOUS at 21:42

## 2025-06-21 RX ADMIN — Medication 5 MILLIGRAM(S): at 21:42

## 2025-06-21 RX ADMIN — Medication 250 MILLIGRAM(S): at 08:01

## 2025-06-21 RX ADMIN — KETOROLAC TROMETHAMINE 15 MILLIGRAM(S): 30 INJECTION, SOLUTION INTRAMUSCULAR; INTRAVENOUS at 10:20

## 2025-06-21 RX ADMIN — KETOROLAC TROMETHAMINE 15 MILLIGRAM(S): 30 INJECTION, SOLUTION INTRAMUSCULAR; INTRAVENOUS at 19:43

## 2025-06-21 RX ADMIN — HEPARIN SODIUM 5000 UNIT(S): 1000 INJECTION INTRAVENOUS; SUBCUTANEOUS at 14:38

## 2025-06-21 NOTE — PROGRESS NOTE ADULT - SUBJECTIVE AND OBJECTIVE BOX
INTERVAL HPI/OVERNIGHT EVENTS:  Pt stable. Going into withdrawal.  Tolerating diet.   flatus/BM.  States right groin pain significantly improved    Vital Signs Last 24 Hrs  T(C): 37 (21 Jun 2025 05:10), Max: 37.9 (20 Jun 2025 21:52)  T(F): 98.6 (21 Jun 2025 05:10), Max: 100.3 (20 Jun 2025 21:52)  HR: 72 (21 Jun 2025 05:10) (72 - 97)  BP: 104/65 (21 Jun 2025 05:10) (94/57 - 128/76)  BP(mean): 78 (21 Jun 2025 05:10) (70 - 78)  RR: 19 (21 Jun 2025 05:10) (16 - 19)  SpO2: 98% (21 Jun 2025 05:10) (96% - 98%)    Parameters below as of 21 Jun 2025 05:10  Patient On (Oxygen Delivery Method): room air        Physical:  Abdomen: Soft nondistended, nontender.  Erythema and swelling right groin improved.  Dressing changed, minimal purulent drainage.  Wound repacked.    I&O's Summary      LABS:                        11.0   28.21 )-----------( 360      ( 21 Jun 2025 05:17 )             31.5             06-21    132[L]  |  101  |  11  ----------------------------<  96  3.1[L]   |  30  |  0.67    Ca    8.3[L]      21 Jun 2025 05:17    TPro  7.0  /  Alb  2.5[L]  /  TBili  0.7  /  DBili  x   /  AST  10  /  ALT  16  /  AlkPhos  103  06-20

## 2025-06-21 NOTE — CONSULT NOTE ADULT - SUBJECTIVE AND OBJECTIVE BOX
Patient is a 44y old  Male who presents with a chief complaint of right scrotal swelling     · Subjective and Objective:   Called see and eval 44y.o. Male w/PMH significant for  heroin use, HTN consulted for  right scrotal swelling, redness and pain x 4 days. Had hernia repair with Dr gordon 6/11/25. Endores having BM Denies fevers, chills, n/v/d, dyurisa,      INTERVAL HPI/OVERNIGHT EVENTS:  T(C): 37 (06-21-25 @ 05:10), Max: 37.9 (06-20-25 @ 21:52)  HR: 72 (06-21-25 @ 05:10) (72 - 97)  BP: 104/65 (06-21-25 @ 05:10) (94/57 - 128/76)  RR: 19 (06-21-25 @ 05:10) (16 - 19)  SpO2: 98% (06-21-25 @ 05:10) (96% - 98%)  Wt(kg): --  I&O's Summary      PAST MEDICAL & SURGICAL HISTORY:  HTN (hypertension)      Heroin abuse      Bipolar affective disorder          SOCIAL HISTORY  Alcohol:  Tobacco:  Illicit substance use:      FAMILY HISTORY:      LABS:                        11.0   28.21 )-----------( 360      ( 21 Jun 2025 05:17 )             31.5     06-21    132[L]  |  101  |  11  ----------------------------<  96  3.1[L]   |  30  |  0.67    Ca    8.3[L]      21 Jun 2025 05:17    TPro  7.0  /  Alb  2.5[L]  /  TBili  0.7  /  DBili  x   /  AST  10  /  ALT  16  /  AlkPhos  103  06-20    PT/INR - ( 20 Jun 2025 17:20 )   PT: 15.6 sec;   INR: 1.34 ratio         PTT - ( 20 Jun 2025 17:20 )  PTT:27.5 sec  Urinalysis Basic - ( 21 Jun 2025 05:17 )    Color: x / Appearance: x / SG: x / pH: x  Gluc: 96 mg/dL / Ketone: x  / Bili: x / Urobili: x   Blood: x / Protein: x / Nitrite: x   Leuk Esterase: x / RBC: x / WBC x   Sq Epi: x / Non Sq Epi: x / Bacteria: x      CAPILLARY BLOOD GLUCOSE      Culture - Abscess with Gram Stain (06.20.25 @ 18:30)   Gram Stain:   Few polymorphonuclear leukocytes seen per low power field   Rare Gram Positive Cocci in Clusters seen per oil power field  Specimen Source: Abscess groin      Urinalysis Basic - ( 21 Jun 2025 05:17 )    Color: x / Appearance: x / SG: x / pH: x  Gluc: 96 mg/dL / Ketone: x  / Bili: x / Urobili: x   Blood: x / Protein: x / Nitrite: x   Leuk Esterase: x / RBC: x / WBC x   Sq Epi: x / Non Sq Epi: x / Bacteria: x        MEDICATIONS  (STANDING):  heparin   Injectable 5000 Unit(s) SubCutaneous every 8 hours  lactated ringers. 1000 milliLiter(s) (120 mL/Hr) IV Continuous <Continuous>  melatonin 5 milliGRAM(s) Oral at bedtime    MEDICATIONS  (PRN):  acetaminophen   IVPB .. 1000 milliGRAM(s) IV Intermittent every 6 hours PRN Temp greater or equal to 38C (100.4F), Mild Pain (1 - 3)  ketorolac   Injectable 15 milliGRAM(s) IV Push every 6 hours PRN Moderate Pain (4 - 6)  ondansetron Injectable 4 milliGRAM(s) IV Push every 6 hours PRN Nausea      REVIEW OF SYSTEMS:  CONSTITUTIONAL: No fever, weight loss, or fatigue  EYES: No eye pain, visual disturbances, or discharge  ENMT:  No difficulty hearing, tinnitus, vertigo; No sinus or throat pain  NECK: No pain or stiffness  RESPIRATORY: No cough, wheezing, chills or hemoptysis; No shortness of breath  CARDIOVASCULAR: No chest pain, palpitations, dizziness, or leg swelling  GASTROINTESTINAL: No abdominal or epigastric pain. No nausea, vomiting, or hematemesis; No diarrhea or constipation. No melena or hematochezia.  GENITOURINARY: No dysuria, frequency, hematuria, or incontinence  NEUROLOGICAL: No headaches, memory loss, loss of strength, numbness, or tremors  SKIN: No itching, burning, rashes, or lesions   LYMPH NODES: No enlarged glands  ENDOCRINE: No heat or cold intolerance; No hair loss  MUSCULOSKELETAL: No joint pain or swelling; No muscle, back, or extremity pain  PSYCHIATRIC: No depression, anxiety, mood swings, or difficulty sleeping  HEME/LYMPH: No easy bruising, or bleeding gums  ALLERY AND IMMUNOLOGIC: No hives or eczema    PHYSICAL EXAM:  GENERAL: NAD, well-groomed, well-developed  HEAD:  Atraumatic, Normocephalic  EYES: EOMI, PERRLA, conjunctiva and sclera clear  ENMT: No tonsillar erythema, exudates, or enlargement; Moist mucous membranes, Good dentition, No lesions  NECK: Supple, No JVD, Normal thyroid  NERVOUS SYSTEM:  Alert & Oriented X3, Good concentration; Motor Strength 5/5 B/L upper and lower extremities; DTRs 2+ intact and symmetric  CHEST/LUNG: Clear to percussion bilaterally; No rales, rhonchi, wheezing, or rubs  HEART: Regular rate and rhythm; No murmurs, rubs, or gallops  ABDOMEN: Soft, Nontender, Nondistended; Bowel sounds present  EXTREMITIES:  2+ Peripheral Pulses, No clubbing, cyanosis, or edema  LYMPH: No lymphadenopathy noted  SKIN: No rashes or lesions    RADIOLOGY & ADDITIONAL TESTS:    Imaging Personally Reviewed:  [ ] YES  [ ] NO    Consultant(s) Notes Reviewed:  [ ] YES  [ ] NO        Care Discussed with Consultants/Other Providers [ ] YES  [ ] NO

## 2025-06-22 LAB
ANION GAP SERPL CALC-SCNC: 2 MMOL/L — LOW (ref 5–17)
BUN SERPL-MCNC: 12 MG/DL — SIGNIFICANT CHANGE UP (ref 7–18)
CALCIUM SERPL-MCNC: 8.7 MG/DL — SIGNIFICANT CHANGE UP (ref 8.4–10.5)
CHLORIDE SERPL-SCNC: 104 MMOL/L — SIGNIFICANT CHANGE UP (ref 96–108)
CO2 SERPL-SCNC: 30 MMOL/L — SIGNIFICANT CHANGE UP (ref 22–31)
CREAT SERPL-MCNC: 0.64 MG/DL — SIGNIFICANT CHANGE UP (ref 0.5–1.3)
EGFR: 118 ML/MIN/1.73M2 — SIGNIFICANT CHANGE UP
EGFR: 118 ML/MIN/1.73M2 — SIGNIFICANT CHANGE UP
GLUCOSE SERPL-MCNC: 87 MG/DL — SIGNIFICANT CHANGE UP (ref 70–99)
HCT VFR BLD CALC: 31.7 % — LOW (ref 39–50)
HGB BLD-MCNC: 11 G/DL — LOW (ref 13–17)
MCHC RBC-ENTMCNC: 29.3 PG — SIGNIFICANT CHANGE UP (ref 27–34)
MCHC RBC-ENTMCNC: 34.7 G/DL — SIGNIFICANT CHANGE UP (ref 32–36)
MCV RBC AUTO: 84.5 FL — SIGNIFICANT CHANGE UP (ref 80–100)
NRBC BLD AUTO-RTO: 0 /100 WBCS — SIGNIFICANT CHANGE UP (ref 0–0)
PLATELET # BLD AUTO: 404 K/UL — HIGH (ref 150–400)
POTASSIUM SERPL-MCNC: 3.4 MMOL/L — LOW (ref 3.5–5.3)
POTASSIUM SERPL-SCNC: 3.4 MMOL/L — LOW (ref 3.5–5.3)
RBC # BLD: 3.75 M/UL — LOW (ref 4.2–5.8)
RBC # FLD: 13.7 % — SIGNIFICANT CHANGE UP (ref 10.3–14.5)
SODIUM SERPL-SCNC: 136 MMOL/L — SIGNIFICANT CHANGE UP (ref 135–145)
WBC # BLD: 17.66 K/UL — HIGH (ref 3.8–10.5)
WBC # FLD AUTO: 17.66 K/UL — HIGH (ref 3.8–10.5)

## 2025-06-22 RX ORDER — VANCOMYCIN HCL IN 5 % DEXTROSE 1.5G/250ML
1000 PLASTIC BAG, INJECTION (ML) INTRAVENOUS ONCE
Refills: 0 | Status: COMPLETED | OUTPATIENT
Start: 2025-06-22 | End: 2025-06-22

## 2025-06-22 RX ORDER — VANCOMYCIN HCL IN 5 % DEXTROSE 1.5G/250ML
1000 PLASTIC BAG, INJECTION (ML) INTRAVENOUS EVERY 12 HOURS
Refills: 0 | Status: DISCONTINUED | OUTPATIENT
Start: 2025-06-22 | End: 2025-06-24

## 2025-06-22 RX ORDER — VANCOMYCIN HCL IN 5 % DEXTROSE 1.5G/250ML
PLASTIC BAG, INJECTION (ML) INTRAVENOUS
Refills: 0 | Status: DISCONTINUED | OUTPATIENT
Start: 2025-06-22 | End: 2025-06-22

## 2025-06-22 RX ORDER — CEFTRIAXONE 500 MG/1
1000 INJECTION, POWDER, FOR SOLUTION INTRAMUSCULAR; INTRAVENOUS EVERY 24 HOURS
Refills: 0 | Status: DISCONTINUED | OUTPATIENT
Start: 2025-06-22 | End: 2025-06-25

## 2025-06-22 RX ORDER — VANCOMYCIN HCL IN 5 % DEXTROSE 1.5G/250ML
1000 PLASTIC BAG, INJECTION (ML) INTRAVENOUS ONCE
Refills: 0 | Status: DISCONTINUED | OUTPATIENT
Start: 2025-06-22 | End: 2025-06-22

## 2025-06-22 RX ADMIN — KETOROLAC TROMETHAMINE 15 MILLIGRAM(S): 30 INJECTION, SOLUTION INTRAMUSCULAR; INTRAVENOUS at 23:20

## 2025-06-22 RX ADMIN — HEPARIN SODIUM 5000 UNIT(S): 1000 INJECTION INTRAVENOUS; SUBCUTANEOUS at 05:41

## 2025-06-22 RX ADMIN — Medication 250 MILLIGRAM(S): at 23:27

## 2025-06-22 RX ADMIN — Medication 40 MILLIGRAM(S): at 12:06

## 2025-06-22 RX ADMIN — Medication 5 MILLIGRAM(S): at 22:23

## 2025-06-22 RX ADMIN — Medication 250 MILLIGRAM(S): at 10:52

## 2025-06-22 RX ADMIN — Medication 1000 MILLIGRAM(S): at 06:04

## 2025-06-22 RX ADMIN — CEFTRIAXONE 100 MILLIGRAM(S): 500 INJECTION, POWDER, FOR SOLUTION INTRAMUSCULAR; INTRAVENOUS at 15:47

## 2025-06-22 RX ADMIN — KETOROLAC TROMETHAMINE 15 MILLIGRAM(S): 30 INJECTION, SOLUTION INTRAMUSCULAR; INTRAVENOUS at 11:30

## 2025-06-22 RX ADMIN — HEPARIN SODIUM 5000 UNIT(S): 1000 INJECTION INTRAVENOUS; SUBCUTANEOUS at 14:56

## 2025-06-22 RX ADMIN — Medication 40 MILLIEQUIVALENT(S): at 09:17

## 2025-06-22 RX ADMIN — HEPARIN SODIUM 5000 UNIT(S): 1000 INJECTION INTRAVENOUS; SUBCUTANEOUS at 22:22

## 2025-06-22 RX ADMIN — Medication 400 MILLIGRAM(S): at 05:42

## 2025-06-22 RX ADMIN — KETOROLAC TROMETHAMINE 15 MILLIGRAM(S): 30 INJECTION, SOLUTION INTRAMUSCULAR; INTRAVENOUS at 22:22

## 2025-06-22 RX ADMIN — KETOROLAC TROMETHAMINE 15 MILLIGRAM(S): 30 INJECTION, SOLUTION INTRAMUSCULAR; INTRAVENOUS at 11:00

## 2025-06-22 NOTE — CONSULT NOTE ADULT - RESPIRATORY
Interventional Radiology -- Immediate Post-Procedure Note    Patient Name:  Kelin Carrera   Procedure Date:  2/17/2021  MRN:  [id number]     Pre-op diagnosis:  AML  Post-op diagnosis:  SAME    Interventional Radiologist:  Arnav Macias MD  Assistant:  None    Anesthesia Type: IV Sedation and Local    Procedure:  Bone marrow biopsy    EBL:  Minimal    Specimen: Yes    Implants: None    Complications:  None    Findings:  No abnormal findings        Carlie Morrow PA-C   2/17/2021   
normal/clear to auscultation bilaterally/no wheezes/no rales/no rhonchi

## 2025-06-22 NOTE — CONSULT NOTE ADULT - ASSESSMENT
44y.o. Male with swelling and discharge from incision site    -vancomycin 1g q12 hrs  -regular diet  -culture of incision drainage  -OOB ambulate  -Pain control prn  -DVT ppx  -Incentive spirometry   -discussed with Dr. Pichardo
Post op wound infection / groin abscess  Cellulitis of lower abdomen/ groin/ scrotum and upper right thigh  Leukocytosis - decreasing      Plan - Switch Vancomycin to 1gm iv q12hrs  Add Rocephin 1 gm iv q24hrs  awaiting sensitivities of the bacteria and once we have them will plan to DC home on PO antibiotics and wound care in the next 2-3 days.

## 2025-06-22 NOTE — PROGRESS NOTE ADULT - SUBJECTIVE AND OBJECTIVE BOX
pt seen in icu [  ], reg med floor [ x  ], bed [ x ], chair at bedside [   ]  Awake, alert, comfortable in bed in NAD  REVIEW OF SYSTEMS:    CONSTITUTIONAL: No weakness, fevers or chills  EYES/ENT: No visual changes;  No vertigo or throat pain   NECK: No pain or stiffness  RESPIRATORY: No cough, wheezing, hemoptysis; No shortness of breath  CARDIOVASCULAR: No chest pain or palpitations  GASTROINTESTINAL: No abdominal or epigastric pain. No nausea, vomiting, or hematemesis; No diarrhea or constipation. No melena or hematochezia.  GENITOURINARY: No dysuria, frequency or hematuria  NEUROLOGICAL: No numbness or weakness  SKIN: No itching, burning, rashes, or lesions   All other review of systems is negative unless indicated above.    Physical Exam    General: WN/WD NAD  Neurology: A&Ox3, nonfocal, GREEN x 4  Respiratory: CTA B/L  CV: RRR, S1S2, no murmurs, rubs or gallops  Abdominal: Soft, NT, ND +BS, Last BM  Extremities: No edema, + peripheral pulses      Allergies  No Known Allergies      Health Issues  Intra-abdominal and pelvic swelling of mass or lump    HTN (hypertension)    Heroin abuse    Bipolar affective disorder        Vitals  T(F): 98.6 (06-22-25 @ 04:45), Max: 99.7 (06-21-25 @ 20:05)  HR: 53 (06-22-25 @ 04:45) (53 - 64)  BP: 120/75 (06-22-25 @ 04:45) (100/60 - 120/75)  RR: 17 (06-22-25 @ 04:45) (17 - 18)  SpO2: 98% (06-22-25 @ 04:45) (97% - 98%)  Wt(kg): --  CAPILLARY BLOOD GLUCOSE          Labs                          11.0   17.66 )-----------( 404      ( 22 Jun 2025 05:10 )             31.7       06-22    136  |  104  |  12  ----------------------------<  87  3.4[L]   |  30  |  0.64    Ca    8.7      22 Jun 2025 05:10    TPro  7.0  /  Alb  2.5[L]  /  TBili  0.7  /  DBili  x   /  AST  10  /  ALT  16  /  AlkPhos  103  06-20            Radiology Results      Meds    MEDICATIONS  (STANDING):  heparin   Injectable 5000 Unit(s) SubCutaneous every 8 hours  melatonin 5 milliGRAM(s) Oral at bedtime  methadone    Tablet 40 milliGRAM(s) Oral daily  vancomycin  IVPB      vancomycin  IVPB 1000 milliGRAM(s) IV Intermittent once      MEDICATIONS  (PRN):  acetaminophen   IVPB .. 1000 milliGRAM(s) IV Intermittent every 6 hours PRN Temp greater or equal to 38C (100.4F), Mild Pain (1 - 3)  ketorolac   Injectable 15 milliGRAM(s) IV Push every 6 hours PRN Moderate Pain (4 - 6)  ondansetron Injectable 4 milliGRAM(s) IV Push every 6 hours PRN Nausea

## 2025-06-22 NOTE — CONSULT NOTE ADULT - GASTROINTESTINAL
soft/nondistended/normal active bowel sounds/no guarding/no rigidity/no organomegaly/no masses palpable/tender negative

## 2025-06-22 NOTE — PROGRESS NOTE ADULT - SUBJECTIVE AND OBJECTIVE BOX
INTERVAL HPI/OVERNIGHT EVENTS:  Pt resting comfortably. Still with RLQ pain.  Voiding well.  Tolerating reg diet.   +flatus/BM.   Denies N/V    MEDICATIONS  (STANDING):  heparin   Injectable 5000 Unit(s) SubCutaneous every 8 hours  melatonin 5 milliGRAM(s) Oral at bedtime  methadone    Tablet 40 milliGRAM(s) Oral daily    MEDICATIONS  (PRN):  acetaminophen   IVPB .. 1000 milliGRAM(s) IV Intermittent every 6 hours PRN Temp greater or equal to 38C (100.4F), Mild Pain (1 - 3)  ketorolac   Injectable 15 milliGRAM(s) IV Push every 6 hours PRN Moderate Pain (4 - 6)  ondansetron Injectable 4 milliGRAM(s) IV Push every 6 hours PRN Nausea    Vital Signs Last 24 Hrs  T(C): 37 (22 Jun 2025 04:45), Max: 37.6 (21 Jun 2025 20:05)  T(F): 98.6 (22 Jun 2025 04:45), Max: 99.7 (21 Jun 2025 20:05)  HR: 53 (22 Jun 2025 04:45) (53 - 64)  BP: 120/75 (22 Jun 2025 04:45) (100/60 - 120/75)  BP(mean): 90 (22 Jun 2025 04:45) (74 - 90)  RR: 17 (22 Jun 2025 04:45) (17 - 18)  SpO2: 98% (22 Jun 2025 04:45) (97% - 98%)    Parameters below as of 22 Jun 2025 04:45  Patient On (Oxygen Delivery Method): room air    Physical:  General: A&Ox3. NAD.  Abdomen: Soft nondistended, R groin wound with seropurulent drainage. Wound probed - seropurulent drainage noted in deep subq tissue.  Pelvis: Scrotal swelling without erythema or induration    LABS:                        11.0   17.66 )-----------( 404      ( 22 Jun 2025 05:10 )             31.7             06-22    136  |  104  |  12  ----------------------------<  87  3.4[L]   |  30  |  0.64    Ca    8.7      22 Jun 2025 05:10    TPro  7.0  /  Alb  2.5[L]  /  TBili  0.7  /  DBili  x   /  AST  10  /  ALT  16  /  AlkPhos  103  06-20

## 2025-06-22 NOTE — CONSULT NOTE ADULT - SUBJECTIVE AND OBJECTIVE BOX
HPI:      PAST MEDICAL & SURGICAL HISTORY:  HTN (hypertension)      Heroin abuse      Bipolar affective disorder          No Known Allergies      Meds:  acetaminophen   IVPB .. 1000 milliGRAM(s) IV Intermittent every 6 hours PRN  heparin   Injectable 5000 Unit(s) SubCutaneous every 8 hours  ketorolac   Injectable 15 milliGRAM(s) IV Push every 6 hours PRN  melatonin 5 milliGRAM(s) Oral at bedtime  methadone    Tablet 40 milliGRAM(s) Oral daily  ondansetron Injectable 4 milliGRAM(s) IV Push every 6 hours PRN  vancomycin  IVPB      vancomycin  IVPB 1000 milliGRAM(s) IV Intermittent once      SOCIAL HISTORY:  Smoker:  YES / NO        PACK YEARS:                         WHEN QUIT?  ETOH use:  YES / NO               FREQUENCY / QUANTITY:  Ilicit Drug use:  YES / NO  Occupation:  Assisted device use (Cane / Walker):  Live with:    FAMILY HISTORY:      VITALS:  Vital Signs Last 24 Hrs  T(C): 37 (22 Jun 2025 04:45), Max: 37.6 (21 Jun 2025 20:05)  T(F): 98.6 (22 Jun 2025 04:45), Max: 99.7 (21 Jun 2025 20:05)  HR: 53 (22 Jun 2025 04:45) (53 - 64)  BP: 120/75 (22 Jun 2025 04:45) (100/60 - 120/75)  BP(mean): 90 (22 Jun 2025 04:45) (74 - 90)  RR: 17 (22 Jun 2025 04:45) (17 - 18)  SpO2: 98% (22 Jun 2025 04:45) (97% - 98%)    Parameters below as of 22 Jun 2025 04:45  Patient On (Oxygen Delivery Method): room air        LABS/DIAGNOSTIC TESTS:                          11.0   17.66 )-----------( 404      ( 22 Jun 2025 05:10 )             31.7     WBC Count: 17.66 K/uL (06-22 @ 05:10)  WBC Count: 28.21 K/uL (06-21 @ 05:17)  WBC Count: 31.00 K/uL (06-20 @ 17:20)      06-22    136  |  104  |  12  ----------------------------<  87  3.4[L]   |  30  |  0.64    Ca    8.7      22 Jun 2025 05:10    TPro  7.0  /  Alb  2.5[L]  /  TBili  0.7  /  DBili  x   /  AST  10  /  ALT  16  /  AlkPhos  103  06-20          LIVER FUNCTIONS - ( 20 Jun 2025 17:20 )  Alb: 2.5 g/dL / Pro: 7.0 g/dL / ALK PHOS: 103 U/L / ALT: 16 U/L DA / AST: 10 U/L / GGT: x             PT/INR - ( 20 Jun 2025 17:20 )   PT: 15.6 sec;   INR: 1.34 ratio         PTT - ( 20 Jun 2025 17:20 )  PTT:27.5 sec    LACTATE:    ABG -     CULTURES:   Abscess groin  06-20 @ 18:30   Moderate Staphylococcus aureus  Rare Proteus mirabilis  --    Few polymorphonuclear leukocytes seen per low power field  Rare Gram Positive Cocci in Clusters seen per oil power field      Blood Blood-Peripheral  06-20 @ 16:37   No growth at 24 hours  --  --          RADIOLOGY:< from: CT Abdomen and Pelvis w/ IV Cont (06.20.25 @ 18:00) >  ACC: [id number] EXAM:  CT ABDOMEN AND PELVIS IC   ORDERED BY: BERNARD AVENDANO     PROCEDURE DATE:  06/20/2025          INTERPRETATION:  CLINICAL INFORMATION: hernia HCT    COMPARISON: None.    CONTRAST/COMPLICATIONS:  IV Contrast: Omnipaque 350  90 cc administered   10 cc discarded  Oral Contrast: NONE.    PROCEDURE:  CT of the Abdomen and Pelvis was performed.  Sagittal and coronal reformats were performed.    FINDINGS:    LOWER CHEST: Within normal limits.    LIVER: Cysts and other lesions too small tocharacterize.  BILE DUCTS: Normal caliber.  GALLBLADDER: Within normal limits.  SPLEEN: Within normal limits.  PANCREAS: Within normal limits.  ADRENALS: Within normal limits.  KIDNEYS/URETERS: Within normal limits.    BLADDER: Within normal limits.  REPRODUCTIVE ORGANS: A large right hydrocele. Scrotal thickening.    BOWEL: No bowel obstruction. The appendix is normal.  PERITONEUM/RETROPERITONEUM: Within normal limits.  VESSELS:  Within normal limits.  LYMPH NODES: Within normal limits.  ABDOMINAL WALL: Focal soft tissue defect lower abdominal wall. Extensive   soft tissue infiltration and skin thickening in the lower abdominal wall   extending into the groin. Prominent inguinal lymph nodes. Patient appears   to be status post right inguinalhernia repair given the history.  BONES: Within normal limits.    IMPRESSION: Extensive soft tissue infiltration and skin thickening of the   lower abdominal wall extending into the groin and scrotum. Please   correlate for cellulitis.    Focal soft tissue defect in the lower abdominal wall.    Large right hydrocele.        --- End of Report ---            REKHA WIN MD; Attending Radiologist  This document has been electronically signed. Jun 20 2025  6:25PM    < end of copied text >        ROS  [  ] UNABLE TO ELICIT               HPI:  44 yr old M who has a PMH significant for heroin abuse in the past and is on a methadone program and bipolar disorder. He had a right groin hernia repair a couple of weeks ago and now returns with swelling and pain in the right groin and lower abdomen and upper right thigh x a few days along with a swollen scrotum and some chills. He was found to have a right groin abscess here along with cellulitis of lower abdomen and and right thigh. He had his right groin wound opened up and pus drained and wound irrigated. He is growing out Staph Aureus and Proteus. He was started on Vancomycin.       PAST MEDICAL & SURGICAL HISTORY:  HTN (hypertension)      Heroin abuse      Bipolar affective disorder          No Known Allergies      Meds:  acetaminophen   IVPB .. 1000 milliGRAM(s) IV Intermittent every 6 hours PRN  heparin   Injectable 5000 Unit(s) SubCutaneous every 8 hours  ketorolac   Injectable 15 milliGRAM(s) IV Push every 6 hours PRN  melatonin 5 milliGRAM(s) Oral at bedtime  methadone    Tablet 40 milliGRAM(s) Oral daily  ondansetron Injectable 4 milliGRAM(s) IV Push every 6 hours PRN  vancomycin  IVPB      vancomycin  IVPB 1000 milliGRAM(s) IV Intermittent once      SOCIAL HISTORY:  Smoker:  YES   ETOH use:  NO    Ilicit Drug use:  in past      FAMILY HISTORY: not contributory.      VITALS:  Vital Signs Last 24 Hrs  T(C): 37 (22 Jun 2025 04:45), Max: 37.6 (21 Jun 2025 20:05)  T(F): 98.6 (22 Jun 2025 04:45), Max: 99.7 (21 Jun 2025 20:05)  HR: 53 (22 Jun 2025 04:45) (53 - 64)  BP: 120/75 (22 Jun 2025 04:45) (100/60 - 120/75)  BP(mean): 90 (22 Jun 2025 04:45) (74 - 90)  RR: 17 (22 Jun 2025 04:45) (17 - 18)  SpO2: 98% (22 Jun 2025 04:45) (97% - 98%)    Parameters below as of 22 Jun 2025 04:45  Patient On (Oxygen Delivery Method): room air        LABS/DIAGNOSTIC TESTS:                          11.0   17.66 )-----------( 404      ( 22 Jun 2025 05:10 )             31.7     WBC Count: 17.66 K/uL (06-22 @ 05:10)  WBC Count: 28.21 K/uL (06-21 @ 05:17)  WBC Count: 31.00 K/uL (06-20 @ 17:20)      06-22    136  |  104  |  12  ----------------------------<  87  3.4[L]   |  30  |  0.64    Ca    8.7      22 Jun 2025 05:10    TPro  7.0  /  Alb  2.5[L]  /  TBili  0.7  /  DBili  x   /  AST  10  /  ALT  16  /  AlkPhos  103  06-20          LIVER FUNCTIONS - ( 20 Jun 2025 17:20 )  Alb: 2.5 g/dL / Pro: 7.0 g/dL / ALK PHOS: 103 U/L / ALT: 16 U/L DA / AST: 10 U/L / GGT: x             PT/INR - ( 20 Jun 2025 17:20 )   PT: 15.6 sec;   INR: 1.34 ratio         PTT - ( 20 Jun 2025 17:20 )  PTT:27.5 sec    LACTATE:    ABG -     CULTURES:   Abscess groin  06-20 @ 18:30   Moderate Staphylococcus aureus  Rare Proteus mirabilis  --    Few polymorphonuclear leukocytes seen per low power field  Rare Gram Positive Cocci in Clusters seen per oil power field      Blood Blood-Peripheral  06-20 @ 16:37   No growth at 24 hours  --  --          RADIOLOGY:< from: CT Abdomen and Pelvis w/ IV Cont (06.20.25 @ 18:00) >  ACC: [id number] EXAM:  CT ABDOMEN AND PELVIS IC   ORDERED BY: BERNARD AVENDANO     PROCEDURE DATE:  06/20/2025          INTERPRETATION:  CLINICAL INFORMATION: hernia HCT    COMPARISON: None.    CONTRAST/COMPLICATIONS:  IV Contrast: Omnipaque 350  90 cc administered   10 cc discarded  Oral Contrast: NONE.    PROCEDURE:  CT of the Abdomen and Pelvis was performed.  Sagittal and coronal reformats were performed.    FINDINGS:    LOWER CHEST: Within normal limits.    LIVER: Cysts and other lesions too small tocharacterize.  BILE DUCTS: Normal caliber.  GALLBLADDER: Within normal limits.  SPLEEN: Within normal limits.  PANCREAS: Within normal limits.  ADRENALS: Within normal limits.  KIDNEYS/URETERS: Within normal limits.    BLADDER: Within normal limits.  REPRODUCTIVE ORGANS: A large right hydrocele. Scrotal thickening.    BOWEL: No bowel obstruction. The appendix is normal.  PERITONEUM/RETROPERITONEUM: Within normal limits.  VESSELS:  Within normal limits.  LYMPH NODES: Within normal limits.  ABDOMINAL WALL: Focal soft tissue defect lower abdominal wall. Extensive   soft tissue infiltration and skin thickening in the lower abdominal wall   extending into the groin. Prominent inguinal lymph nodes. Patient appears   to be status post right inguinalhernia repair given the history.  BONES: Within normal limits.    IMPRESSION: Extensive soft tissue infiltration and skin thickening of the   lower abdominal wall extending into the groin and scrotum. Please   correlate for cellulitis.    Focal soft tissue defect in the lower abdominal wall.    Large right hydrocele.        --- End of Report ---            REKHA WIN MD; Attending Radiologist  This document has been electronically signed. Jun 20 2025  6:25PM    < end of copied text >        ROS  [  ] UNABLE TO ELICIT               HPI:  44 yr old M who has a PMH significant for heroin abuse in the past and is on a methadone program and bipolar disorder. He had a right groin hernia repair a couple of weeks ago and now returns with swelling and pain in the right groin and lower abdomen and upper right thigh x a few days along with a swollen scrotum and some chills. He was found to have a right groin abscess here along with cellulitis of lower abdomen and  right thigh. He had his right groin wound opened up and pus drained and wound irrigated. He is growing out Staph Aureus and Proteus. He was started on Vancomycin. He had a very high WBC count on admission that is decreasing nicely.      PAST MEDICAL & SURGICAL HISTORY:  HTN (hypertension)      Heroin abuse      Bipolar affective disorder          No Known Allergies      Meds:  acetaminophen   IVPB .. 1000 milliGRAM(s) IV Intermittent every 6 hours PRN  heparin   Injectable 5000 Unit(s) SubCutaneous every 8 hours  ketorolac   Injectable 15 milliGRAM(s) IV Push every 6 hours PRN  melatonin 5 milliGRAM(s) Oral at bedtime  methadone    Tablet 40 milliGRAM(s) Oral daily  ondansetron Injectable 4 milliGRAM(s) IV Push every 6 hours PRN  vancomycin  IVPB      vancomycin  IVPB 1000 milliGRAM(s) IV Intermittent once      SOCIAL HISTORY:  Smoker:  YES   ETOH use:  NO    Ilicit Drug use:  in past      FAMILY HISTORY: not contributory.      VITALS:  Vital Signs Last 24 Hrs  T(C): 37 (22 Jun 2025 04:45), Max: 37.6 (21 Jun 2025 20:05)  T(F): 98.6 (22 Jun 2025 04:45), Max: 99.7 (21 Jun 2025 20:05)  HR: 53 (22 Jun 2025 04:45) (53 - 64)  BP: 120/75 (22 Jun 2025 04:45) (100/60 - 120/75)  BP(mean): 90 (22 Jun 2025 04:45) (74 - 90)  RR: 17 (22 Jun 2025 04:45) (17 - 18)  SpO2: 98% (22 Jun 2025 04:45) (97% - 98%)    Parameters below as of 22 Jun 2025 04:45  Patient On (Oxygen Delivery Method): room air        LABS/DIAGNOSTIC TESTS:                          11.0   17.66 )-----------( 404      ( 22 Jun 2025 05:10 )             31.7     WBC Count: 17.66 K/uL (06-22 @ 05:10)  WBC Count: 28.21 K/uL (06-21 @ 05:17)  WBC Count: 31.00 K/uL (06-20 @ 17:20)      06-22    136  |  104  |  12  ----------------------------<  87  3.4[L]   |  30  |  0.64    Ca    8.7      22 Jun 2025 05:10    TPro  7.0  /  Alb  2.5[L]  /  TBili  0.7  /  DBili  x   /  AST  10  /  ALT  16  /  AlkPhos  103  06-20          LIVER FUNCTIONS - ( 20 Jun 2025 17:20 )  Alb: 2.5 g/dL / Pro: 7.0 g/dL / ALK PHOS: 103 U/L / ALT: 16 U/L DA / AST: 10 U/L / GGT: x             PT/INR - ( 20 Jun 2025 17:20 )   PT: 15.6 sec;   INR: 1.34 ratio         PTT - ( 20 Jun 2025 17:20 )  PTT:27.5 sec    LACTATE:    ABG -     CULTURES:   Abscess groin  06-20 @ 18:30   Moderate Staphylococcus aureus  Rare Proteus mirabilis  --    Few polymorphonuclear leukocytes seen per low power field  Rare Gram Positive Cocci in Clusters seen per oil power field      Blood Blood-Peripheral  06-20 @ 16:37   No growth at 24 hours  --  --          RADIOLOGY:< from: CT Abdomen and Pelvis w/ IV Cont (06.20.25 @ 18:00) >  ACC: [id number] EXAM:  CT ABDOMEN AND PELVIS IC   ORDERED BY: BERNARD AVENDANO     PROCEDURE DATE:  06/20/2025          INTERPRETATION:  CLINICAL INFORMATION: hernia HCT    COMPARISON: None.    CONTRAST/COMPLICATIONS:  IV Contrast: Omnipaque 350  90 cc administered   10 cc discarded  Oral Contrast: NONE.    PROCEDURE:  CT of the Abdomen and Pelvis was performed.  Sagittal and coronal reformats were performed.    FINDINGS:    LOWER CHEST: Within normal limits.    LIVER: Cysts and other lesions too small tocharacterize.  BILE DUCTS: Normal caliber.  GALLBLADDER: Within normal limits.  SPLEEN: Within normal limits.  PANCREAS: Within normal limits.  ADRENALS: Within normal limits.  KIDNEYS/URETERS: Within normal limits.    BLADDER: Within normal limits.  REPRODUCTIVE ORGANS: A large right hydrocele. Scrotal thickening.    BOWEL: No bowel obstruction. The appendix is normal.  PERITONEUM/RETROPERITONEUM: Within normal limits.  VESSELS:  Within normal limits.  LYMPH NODES: Within normal limits.  ABDOMINAL WALL: Focal soft tissue defect lower abdominal wall. Extensive   soft tissue infiltration and skin thickening in the lower abdominal wall   extending into the groin. Prominent inguinal lymph nodes. Patient appears   to be status post right inguinalhernia repair given the history.  BONES: Within normal limits.    IMPRESSION: Extensive soft tissue infiltration and skin thickening of the   lower abdominal wall extending into the groin and scrotum. Please   correlate for cellulitis.    Focal soft tissue defect in the lower abdominal wall.    Large right hydrocele.        --- End of Report ---            REKHA WIN MD; Attending Radiologist  This document has been electronically signed. Jun 20 2025  6:25PM    < end of copied text >        ROS  [  ] UNABLE TO ELICIT

## 2025-06-22 NOTE — CONSULT NOTE ADULT - GENITOURINARY COMMENTS
scrotal swelling and pain scrotal swelling , some mild warmth and redness and tenderness from cellulitis

## 2025-06-22 NOTE — CONSULT NOTE ADULT - SKIN COMMENTS
right groin area wound with packing in it, some selling around area, minimal erythema and warmth, tenderness +

## 2025-06-23 LAB
-  AMOXICILLIN/CLAVULANIC ACID: SIGNIFICANT CHANGE UP
-  AMPICILLIN/SULBACTAM: SIGNIFICANT CHANGE UP
-  AMPICILLIN: SIGNIFICANT CHANGE UP
-  AZTREONAM: SIGNIFICANT CHANGE UP
-  CEFAZOLIN: SIGNIFICANT CHANGE UP
-  CEFEPIME: SIGNIFICANT CHANGE UP
-  CEFOXITIN: SIGNIFICANT CHANGE UP
-  CEFTRIAXONE: SIGNIFICANT CHANGE UP
-  CIPROFLOXACIN: SIGNIFICANT CHANGE UP
-  CLINDAMYCIN: SIGNIFICANT CHANGE UP
-  ERTAPENEM: SIGNIFICANT CHANGE UP
-  ERYTHROMYCIN: SIGNIFICANT CHANGE UP
-  GENTAMICIN: SIGNIFICANT CHANGE UP
-  GENTAMICIN: SIGNIFICANT CHANGE UP
-  LEVOFLOXACIN: SIGNIFICANT CHANGE UP
-  MEROPENEM: SIGNIFICANT CHANGE UP
-  OXACILLIN: SIGNIFICANT CHANGE UP
-  PENICILLIN: SIGNIFICANT CHANGE UP
-  PIPERACILLIN/TAZOBACTAM: SIGNIFICANT CHANGE UP
-  RIFAMPIN: SIGNIFICANT CHANGE UP
-  TETRACYCLINE: SIGNIFICANT CHANGE UP
-  TOBRAMYCIN: SIGNIFICANT CHANGE UP
-  TRIMETHOPRIM/SULFAMETHOXAZOLE: SIGNIFICANT CHANGE UP
-  TRIMETHOPRIM/SULFAMETHOXAZOLE: SIGNIFICANT CHANGE UP
-  VANCOMYCIN: SIGNIFICANT CHANGE UP
ANION GAP SERPL CALC-SCNC: 4 MMOL/L — LOW (ref 5–17)
BASOPHILS # BLD AUTO: 0.12 K/UL — SIGNIFICANT CHANGE UP (ref 0–0.2)
BASOPHILS NFR BLD AUTO: 1 % — SIGNIFICANT CHANGE UP (ref 0–2)
BUN SERPL-MCNC: 17 MG/DL — SIGNIFICANT CHANGE UP (ref 7–18)
CALCIUM SERPL-MCNC: 8.5 MG/DL — SIGNIFICANT CHANGE UP (ref 8.4–10.5)
CHLORIDE SERPL-SCNC: 101 MMOL/L — SIGNIFICANT CHANGE UP (ref 96–108)
CO2 SERPL-SCNC: 28 MMOL/L — SIGNIFICANT CHANGE UP (ref 22–31)
CREAT SERPL-MCNC: 0.74 MG/DL — SIGNIFICANT CHANGE UP (ref 0.5–1.3)
EGFR: 113 ML/MIN/1.73M2 — SIGNIFICANT CHANGE UP
EGFR: 113 ML/MIN/1.73M2 — SIGNIFICANT CHANGE UP
EOSINOPHIL # BLD AUTO: 0.91 K/UL — HIGH (ref 0–0.5)
EOSINOPHIL NFR BLD AUTO: 7.8 % — HIGH (ref 0–6)
GLUCOSE SERPL-MCNC: 87 MG/DL — SIGNIFICANT CHANGE UP (ref 70–99)
HCT VFR BLD CALC: 34.1 % — LOW (ref 39–50)
HGB BLD-MCNC: 11.5 G/DL — LOW (ref 13–17)
IMM GRANULOCYTES NFR BLD AUTO: 2.4 % — HIGH (ref 0–0.9)
LYMPHOCYTES # BLD AUTO: 2.96 K/UL — SIGNIFICANT CHANGE UP (ref 1–3.3)
LYMPHOCYTES # BLD AUTO: 25.5 % — SIGNIFICANT CHANGE UP (ref 13–44)
MANUAL SMEAR VERIFICATION: SIGNIFICANT CHANGE UP
MCHC RBC-ENTMCNC: 29.1 PG — SIGNIFICANT CHANGE UP (ref 27–34)
MCHC RBC-ENTMCNC: 33.7 G/DL — SIGNIFICANT CHANGE UP (ref 32–36)
MCV RBC AUTO: 86.3 FL — SIGNIFICANT CHANGE UP (ref 80–100)
METHOD TYPE: SIGNIFICANT CHANGE UP
METHOD TYPE: SIGNIFICANT CHANGE UP
MONOCYTES # BLD AUTO: 0.85 K/UL — SIGNIFICANT CHANGE UP (ref 0–0.9)
MONOCYTES NFR BLD AUTO: 7.3 % — SIGNIFICANT CHANGE UP (ref 2–14)
NEUTROPHILS # BLD AUTO: 6.49 K/UL — SIGNIFICANT CHANGE UP (ref 1.8–7.4)
NEUTROPHILS NFR BLD AUTO: 56 % — SIGNIFICANT CHANGE UP (ref 43–77)
NRBC BLD AUTO-RTO: 0 /100 WBCS — SIGNIFICANT CHANGE UP (ref 0–0)
PLAT MORPH BLD: NORMAL — SIGNIFICANT CHANGE UP
PLATELET # BLD AUTO: 414 K/UL — HIGH (ref 150–400)
PLATELET COUNT - ESTIMATE: ABNORMAL
POTASSIUM SERPL-MCNC: 4 MMOL/L — SIGNIFICANT CHANGE UP (ref 3.5–5.3)
POTASSIUM SERPL-SCNC: 4 MMOL/L — SIGNIFICANT CHANGE UP (ref 3.5–5.3)
RBC # BLD: 3.95 M/UL — LOW (ref 4.2–5.8)
RBC # FLD: 13.5 % — SIGNIFICANT CHANGE UP (ref 10.3–14.5)
RBC BLD AUTO: NORMAL — SIGNIFICANT CHANGE UP
SODIUM SERPL-SCNC: 133 MMOL/L — LOW (ref 135–145)
VANCOMYCIN TROUGH SERPL-MCNC: 5.6 UG/ML — LOW (ref 10–20)
WBC # BLD: 11.61 K/UL — HIGH (ref 3.8–10.5)
WBC # FLD AUTO: 11.61 K/UL — HIGH (ref 3.8–10.5)

## 2025-06-23 RX ADMIN — HEPARIN SODIUM 5000 UNIT(S): 1000 INJECTION INTRAVENOUS; SUBCUTANEOUS at 05:18

## 2025-06-23 RX ADMIN — CEFTRIAXONE 100 MILLIGRAM(S): 500 INJECTION, POWDER, FOR SOLUTION INTRAMUSCULAR; INTRAVENOUS at 12:53

## 2025-06-23 RX ADMIN — Medication 250 MILLIGRAM(S): at 22:15

## 2025-06-23 RX ADMIN — KETOROLAC TROMETHAMINE 15 MILLIGRAM(S): 30 INJECTION, SOLUTION INTRAMUSCULAR; INTRAVENOUS at 11:57

## 2025-06-23 RX ADMIN — Medication 5 MILLIGRAM(S): at 21:10

## 2025-06-23 RX ADMIN — Medication 250 MILLIGRAM(S): at 12:49

## 2025-06-23 RX ADMIN — HEPARIN SODIUM 5000 UNIT(S): 1000 INJECTION INTRAVENOUS; SUBCUTANEOUS at 21:11

## 2025-06-23 RX ADMIN — Medication 40 MILLIGRAM(S): at 12:52

## 2025-06-23 RX ADMIN — KETOROLAC TROMETHAMINE 15 MILLIGRAM(S): 30 INJECTION, SOLUTION INTRAMUSCULAR; INTRAVENOUS at 10:57

## 2025-06-23 NOTE — SBIRT NOTE ADULT - NSSBIRTDRGBRIEFINTDET_GEN_A_CORE
Pt reports using heroin, marijuana, fentanyl and pills (benzos)  Pt reports using heroin, cocaine, marijuana, fentanyl and pills (benzos) in the community. Pt reports that he was previously incarcerated ([id number]) and part of his release agreement was to attend sobriety clinics. Pt reports previously attending methadone clinic as well however reports that "it was not helpful". Pt reports relapsing 7 months ago. Pt reports that last O/D was last year due to fentanyl. Pt reports signing up for [facility] and is agreeable for further outpatient services as well. [facility] provided resources for further substance abuse sobriety.

## 2025-06-23 NOTE — PROGRESS NOTE ADULT - SUBJECTIVE AND OBJECTIVE BOX
pt seen in icu [  ], reg med floor [ x  ], bed [x  ], chair at bedside [   ]  Awake, alert, comfortable in bed in NAD  REVIEW OF SYSTEMS:    CONSTITUTIONAL: No weakness, fevers or chills  EYES/ENT: No visual changes;  No vertigo or throat pain   NECK: No pain or stiffness  RESPIRATORY: No cough, wheezing, hemoptysis; No shortness of breath  CARDIOVASCULAR: No chest pain or palpitations  GASTROINTESTINAL: No abdominal or epigastric pain. No nausea, vomiting, or hematemesis; No diarrhea or constipation. No melena or hematochezia.  GENITOURINARY: No dysuria, frequency or hematuria  NEUROLOGICAL: No numbness or weakness  SKIN: No itching, burning, rashes, or lesions   All other review of systems is negative unless indicated above.    Physical Exam    General: WN/WD NAD  Neurology: A&Ox3, nonfocal, GREEN x 4  Respiratory: CTA B/L  CV: RRR, S1S2, no murmurs, rubs or gallops  Abdominal: Soft, NT, ND +BS, Last BM  Extremities: No edema, + peripheral pulses      Allergies  No Known Allergies      Health Issues  Intra-abdominal and pelvic swelling of mass or lump    HTN (hypertension)    Heroin abuse    Bipolar affective disorder        Vitals  T(F): 98.2 (06-23-25 @ 05:06), Max: 98.5 (06-22-25 @ 20:10)  HR: 50 (06-23-25 @ 05:06) (50 - 54)  BP: 111/67 (06-23-25 @ 05:06) (108/70 - 116/72)  RR: 17 (06-23-25 @ 05:06) (17 - 17)  SpO2: 98% (06-23-25 @ 05:06) (97% - 98%)  Wt(kg): --  CAPILLARY BLOOD GLUCOSE          Labs                          11.5   11.61 )-----------( 414      ( 23 Jun 2025 05:22 )             34.1       06-23    133[L]  |  101  |  17  ----------------------------<  87  4.0   |  28  |  0.74    Ca    8.5      23 Jun 2025 05:22          Culture Results:   Moderate Staphylococcus aureus   Rare Proteus mirabilis  Organism Identification: Staphylococcus aureus   Proteus mirabilis  Organism: Staphylococcus aureus  Organism: Proteus mirabilis  Method Type: VISH  Method Type: VISH    Radiology Results      Meds    MEDICATIONS  (STANDING):  cefTRIAXone   IVPB 1000 milliGRAM(s) IV Intermittent every 24 hours  heparin   Injectable 5000 Unit(s) SubCutaneous every 8 hours  melatonin 5 milliGRAM(s) Oral at bedtime  methadone    Tablet 40 milliGRAM(s) Oral daily  vancomycin  IVPB 1000 milliGRAM(s) IV Intermittent every 12 hours      MEDICATIONS  (PRN):  acetaminophen   IVPB .. 1000 milliGRAM(s) IV Intermittent every 6 hours PRN Temp greater or equal to 38C (100.4F), Mild Pain (1 - 3)  ketorolac   Injectable 15 milliGRAM(s) IV Push every 6 hours PRN Moderate Pain (4 - 6)  ondansetron Injectable 4 milliGRAM(s) IV Push every 6 hours PRN Nausea

## 2025-06-23 NOTE — PROGRESS NOTE ADULT - SUBJECTIVE AND OBJECTIVE BOX
INTERVAL HPI/OVERNIGHT EVENTS:  Pt resting comfortably. Stable R groin pain.  States R testicular pain when ambulating.  Tolerating reg diet.     MEDICATIONS  (STANDING):  cefTRIAXone   IVPB 1000 milliGRAM(s) IV Intermittent every 24 hours  heparin   Injectable 5000 Unit(s) SubCutaneous every 8 hours  melatonin 5 milliGRAM(s) Oral at bedtime  methadone    Tablet 40 milliGRAM(s) Oral daily  vancomycin  IVPB 1000 milliGRAM(s) IV Intermittent every 12 hours    MEDICATIONS  (PRN):  acetaminophen   IVPB .. 1000 milliGRAM(s) IV Intermittent every 6 hours PRN Temp greater or equal to 38C (100.4F), Mild Pain (1 - 3)  ketorolac   Injectable 15 milliGRAM(s) IV Push every 6 hours PRN Moderate Pain (4 - 6)  ondansetron Injectable 4 milliGRAM(s) IV Push every 6 hours PRN Nausea    Vital Signs Last 24 Hrs  T(C): 36.8 (23 Jun 2025 05:06), Max: 36.9 (22 Jun 2025 13:21)  T(F): 98.2 (23 Jun 2025 05:06), Max: 98.5 (22 Jun 2025 20:10)  HR: 50 (23 Jun 2025 05:06) (50 - 54)  BP: 111/67 (23 Jun 2025 05:06) (108/70 - 116/72)  BP(mean): 81 (23 Jun 2025 05:06) (81 - 81)  RR: 17 (23 Jun 2025 05:06) (17 - 17)  SpO2: 98% (23 Jun 2025 05:06) (97% - 98%)    Parameters below as of 23 Jun 2025 05:06  Patient On (Oxygen Delivery Method): room air    Physical:  General: A&Ox3. NAD.  Abdomen: Soft nondistended, R groin wound with seropurulent drainage. Wound probed - seropurulent drainage noted in deep subq tissue.  Pelvis: Scrotal swelling without erythema or induration    I&O's Detail    22 Jun 2025 07:01  -  23 Jun 2025 07:00  --------------------------------------------------------  IN:    Oral Fluid: 400 mL  Total IN: 400 mL    OUT:    Voided (mL): 800 mL  Total OUT: 800 mL    Total NET: -400 mL    LABS:                        11.5   11.61 )-----------( 414      ( 23 Jun 2025 05:22 )             34.1             06-23    133[L]  |  101  |  17  ----------------------------<  87  4.0   |  28  |  0.74    Ca    8.5      23 Jun 2025 05:22

## 2025-06-24 LAB
ANION GAP SERPL CALC-SCNC: 1 MMOL/L — LOW (ref 5–17)
BASOPHILS # BLD AUTO: 0.15 K/UL — SIGNIFICANT CHANGE UP (ref 0–0.2)
BASOPHILS NFR BLD AUTO: 1.1 % — SIGNIFICANT CHANGE UP (ref 0–2)
BUN SERPL-MCNC: 15 MG/DL — SIGNIFICANT CHANGE UP (ref 7–18)
CALCIUM SERPL-MCNC: 9.1 MG/DL — SIGNIFICANT CHANGE UP (ref 8.4–10.5)
CHLORIDE SERPL-SCNC: 100 MMOL/L — SIGNIFICANT CHANGE UP (ref 96–108)
CO2 SERPL-SCNC: 33 MMOL/L — HIGH (ref 22–31)
CREAT SERPL-MCNC: 0.88 MG/DL — SIGNIFICANT CHANGE UP (ref 0.5–1.3)
EGFR: 107 ML/MIN/1.73M2 — SIGNIFICANT CHANGE UP
EGFR: 107 ML/MIN/1.73M2 — SIGNIFICANT CHANGE UP
EOSINOPHIL # BLD AUTO: 0.69 K/UL — HIGH (ref 0–0.5)
EOSINOPHIL NFR BLD AUTO: 4.9 % — SIGNIFICANT CHANGE UP (ref 0–6)
GLUCOSE SERPL-MCNC: 85 MG/DL — SIGNIFICANT CHANGE UP (ref 70–99)
HCT VFR BLD CALC: 37.3 % — LOW (ref 39–50)
HGB BLD-MCNC: 12.6 G/DL — LOW (ref 13–17)
IMM GRANULOCYTES NFR BLD AUTO: 6 % — HIGH (ref 0–0.9)
LYMPHOCYTES # BLD AUTO: 21.9 % — SIGNIFICANT CHANGE UP (ref 13–44)
LYMPHOCYTES # BLD AUTO: 3.08 K/UL — SIGNIFICANT CHANGE UP (ref 1–3.3)
MCHC RBC-ENTMCNC: 29 PG — SIGNIFICANT CHANGE UP (ref 27–34)
MCHC RBC-ENTMCNC: 33.8 G/DL — SIGNIFICANT CHANGE UP (ref 32–36)
MCV RBC AUTO: 85.7 FL — SIGNIFICANT CHANGE UP (ref 80–100)
MONOCYTES # BLD AUTO: 1.06 K/UL — HIGH (ref 0–0.9)
MONOCYTES NFR BLD AUTO: 7.5 % — SIGNIFICANT CHANGE UP (ref 2–14)
NEUTROPHILS # BLD AUTO: 8.24 K/UL — HIGH (ref 1.8–7.4)
NEUTROPHILS NFR BLD AUTO: 58.6 % — SIGNIFICANT CHANGE UP (ref 43–77)
NRBC BLD AUTO-RTO: 0 /100 WBCS — SIGNIFICANT CHANGE UP (ref 0–0)
PLATELET # BLD AUTO: 481 K/UL — HIGH (ref 150–400)
POTASSIUM SERPL-MCNC: 4.4 MMOL/L — SIGNIFICANT CHANGE UP (ref 3.5–5.3)
POTASSIUM SERPL-SCNC: 4.4 MMOL/L — SIGNIFICANT CHANGE UP (ref 3.5–5.3)
RBC # BLD: 4.35 M/UL — SIGNIFICANT CHANGE UP (ref 4.2–5.8)
RBC # FLD: 13.7 % — SIGNIFICANT CHANGE UP (ref 10.3–14.5)
SODIUM SERPL-SCNC: 134 MMOL/L — LOW (ref 135–145)
WBC # BLD: 14.07 K/UL — HIGH (ref 3.8–10.5)
WBC # FLD AUTO: 14.07 K/UL — HIGH (ref 3.8–10.5)

## 2025-06-24 RX ADMIN — CEFTRIAXONE 100 MILLIGRAM(S): 500 INJECTION, POWDER, FOR SOLUTION INTRAMUSCULAR; INTRAVENOUS at 11:58

## 2025-06-24 RX ADMIN — HEPARIN SODIUM 5000 UNIT(S): 1000 INJECTION INTRAVENOUS; SUBCUTANEOUS at 21:17

## 2025-06-24 RX ADMIN — HEPARIN SODIUM 5000 UNIT(S): 1000 INJECTION INTRAVENOUS; SUBCUTANEOUS at 13:39

## 2025-06-24 RX ADMIN — KETOROLAC TROMETHAMINE 15 MILLIGRAM(S): 30 INJECTION, SOLUTION INTRAMUSCULAR; INTRAVENOUS at 05:32

## 2025-06-24 RX ADMIN — HEPARIN SODIUM 5000 UNIT(S): 1000 INJECTION INTRAVENOUS; SUBCUTANEOUS at 05:32

## 2025-06-24 RX ADMIN — KETOROLAC TROMETHAMINE 15 MILLIGRAM(S): 30 INJECTION, SOLUTION INTRAMUSCULAR; INTRAVENOUS at 06:25

## 2025-06-24 RX ADMIN — Medication 40 MILLIGRAM(S): at 11:58

## 2025-06-24 RX ADMIN — Medication 5 MILLIGRAM(S): at 21:17

## 2025-06-24 RX ADMIN — Medication 250 MILLIGRAM(S): at 11:07

## 2025-06-24 RX ADMIN — Medication 40 MILLIGRAM(S): at 05:32

## 2025-06-24 NOTE — PROGRESS NOTE ADULT - SUBJECTIVE AND OBJECTIVE BOX
INTERVAL HPI/OVERNIGHT EVENTS:  Pt stable.   Tolerating diet.   flatus/BM.  Ambulating.  Groin pain markedly improved.    Vital Signs Last 24 Hrs  T(C): 36.9 (24 Jun 2025 05:32), Max: 37.2 (23 Jun 2025 20:50)  T(F): 98.4 (24 Jun 2025 05:32), Max: 99 (23 Jun 2025 20:50)  HR: 54 (24 Jun 2025 05:32) (54 - 62)  BP: 105/65 (24 Jun 2025 05:32) (101/59 - 111/64)  BP(mean): 73 (23 Jun 2025 20:50) (73 - 80)  RR: 18 (24 Jun 2025 05:32) (16 - 18)  SpO2: 96% (24 Jun 2025 05:32) (95% - 97%)    Parameters below as of 24 Jun 2025 05:32  Patient On (Oxygen Delivery Method): room air        Physical:  Abdomen: Soft nondistended, nontender.  Right groin swelling and erythema improved  Base of wound granulating well    I&O's Summary    23 Jun 2025 07:01  -  24 Jun 2025 07:00  --------------------------------------------------------  IN: 750 mL / OUT: 1650 mL / NET: -900 mL        LABS:                        12.6   14.07 )-----------( 481      ( 24 Jun 2025 06:12 )             37.3             06-24    134[L]  |  100  |  15  ----------------------------<  85  4.4   |  33[H]  |  0.88    Ca    9.1      24 Jun 2025 06:12

## 2025-06-24 NOTE — PROGRESS NOTE ADULT - SUBJECTIVE AND OBJECTIVE BOX
INTERVAL HPI/OVERNIGHT EVENTS:  Pt resting comfortably. No acute complaints. Tolerating diet. Endorses testicular pain with ambulation. Urinating ok. Denies fever, chills.     MEDICATIONS  (STANDING):  cefTRIAXone   IVPB 1000 milliGRAM(s) IV Intermittent every 24 hours  heparin   Injectable 5000 Unit(s) SubCutaneous every 8 hours  melatonin 5 milliGRAM(s) Oral at bedtime  methadone    Tablet 40 milliGRAM(s) Oral daily  pantoprazole    Tablet 40 milliGRAM(s) Oral before breakfast  vancomycin  IVPB 1000 milliGRAM(s) IV Intermittent every 12 hours    MEDICATIONS  (PRN):  acetaminophen   IVPB .. 1000 milliGRAM(s) IV Intermittent every 6 hours PRN Temp greater or equal to 38C (100.4F), Mild Pain (1 - 3)  ketorolac   Injectable 15 milliGRAM(s) IV Push every 6 hours PRN Moderate Pain (4 - 6)  ondansetron Injectable 4 milliGRAM(s) IV Push every 6 hours PRN Nausea      Vital Signs Last 24 Hrs  T(C): 36.9 (24 Jun 2025 05:32), Max: 37.2 (23 Jun 2025 20:50)  T(F): 98.4 (24 Jun 2025 05:32), Max: 99 (23 Jun 2025 20:50)  HR: 54 (24 Jun 2025 05:32) (54 - 62)  BP: 105/65 (24 Jun 2025 05:32) (101/59 - 111/64)  BP(mean): 73 (23 Jun 2025 20:50) (73 - 80)  RR: 18 (24 Jun 2025 05:32) (16 - 18)  SpO2: 96% (24 Jun 2025 05:32) (95% - 97%)    Parameters below as of 24 Jun 2025 05:32  Patient On (Oxygen Delivery Method): room air        Physical:  General: A&Ox3. NAD.  Resp: Unlabored breathing. Equal chest rise bilaterally.   Abdomen: Soft nondistended, nontender to palpation diffusely.   Groin: R groin incision s/p bedside I&D, packing removed. Purulent drainage expressed, wound irrigated. Packed w dry gauze. Skin clean, pink wound base. Nonerythematous. +scrotal edema with mild R testicular pain to palpation. Scrotum is nonerythematous, no open wounds noted.   Skin: Warm and dry.     I&O's Detail    23 Jun 2025 07:01  -  24 Jun 2025 07:00  --------------------------------------------------------  IN:    Oral Fluid: 750 mL  Total IN: 750 mL    OUT:    Voided (mL): 1650 mL  Total OUT: 1650 mL    Total NET: -900 mL          LABS:                        12.6   14.07 )-----------( 481      ( 24 Jun 2025 06:12 )             37.3             06-24    134[L]  |  100  |  15  ----------------------------<  85  4.4   |  33[H]  |  0.88    Ca    9.1      24 Jun 2025 06:12        44y.o. Male

## 2025-06-24 NOTE — PROGRESS NOTE ADULT - SUBJECTIVE AND OBJECTIVE BOX
pt seen in icu [  ], reg med floor [ x  ], bed [ x ], chair at bedside [   ]  Awake, alert, comfortable in NAD.  REVIEW OF SYSTEMS:    CONSTITUTIONAL: No weakness, fevers or chills  EYES/ENT: No visual changes;  No vertigo or throat pain   NECK: No pain or stiffness  RESPIRATORY: No cough, wheezing, hemoptysis; No shortness of breath  CARDIOVASCULAR: No chest pain or palpitations  GASTROINTESTINAL: No abdominal or epigastric pain. No nausea, vomiting, or hematemesis; No diarrhea or constipation. No melena or hematochezia.  GENITOURINARY: No dysuria, frequency or hematuria  NEUROLOGICAL: No numbness or weakness  SKIN: No itching, burning, rashes, or lesions   All other review of systems is negative unless indicated above.    Physical Exam    General: WN/WD NAD  Neurology: A&Ox3, nonfocal, GREEN x 4  Respiratory: CTA B/L  CV: RRR, S1S2, no murmurs, rubs or gallops  Abdominal: Soft, NT, ND +BS, Last BM  Extremities: No edema, + peripheral pulses      Allergies  No Known Allergies      Health Issues  Intra-abdominal and pelvic swelling of mass or lump    HTN (hypertension)    Heroin abuse    Bipolar affective disorder        Vitals  T(F): 98.4 (06-24-25 @ 05:32), Max: 99 (06-23-25 @ 20:50)  HR: 54 (06-24-25 @ 05:32) (54 - 62)  BP: 105/65 (06-24-25 @ 05:32) (101/59 - 111/64)  RR: 18 (06-24-25 @ 05:32) (16 - 18)  SpO2: 96% (06-24-25 @ 05:32) (95% - 97%)  Wt(kg): --  CAPILLARY BLOOD GLUCOSE          Labs                          12.6   14.07 )-----------( 481      ( 24 Jun 2025 06:12 )             37.3       06-24    134[L]  |  100  |  15  ----------------------------<  85  4.4   |  33[H]  |  0.88    Ca    9.1      24 Jun 2025 06:12              Radiology Results      Meds    MEDICATIONS  (STANDING):  cefTRIAXone   IVPB 1000 milliGRAM(s) IV Intermittent every 24 hours  heparin   Injectable 5000 Unit(s) SubCutaneous every 8 hours  melatonin 5 milliGRAM(s) Oral at bedtime  methadone    Tablet 40 milliGRAM(s) Oral daily  pantoprazole    Tablet 40 milliGRAM(s) Oral before breakfast  vancomycin  IVPB 1000 milliGRAM(s) IV Intermittent every 12 hours      MEDICATIONS  (PRN):  acetaminophen   IVPB .. 1000 milliGRAM(s) IV Intermittent every 6 hours PRN Temp greater or equal to 38C (100.4F), Mild Pain (1 - 3)  ketorolac   Injectable 15 milliGRAM(s) IV Push every 6 hours PRN Moderate Pain (4 - 6)  ondansetron Injectable 4 milliGRAM(s) IV Push every 6 hours PRN Nausea

## 2025-06-24 NOTE — PROGRESS NOTE ADULT - SUBJECTIVE AND OBJECTIVE BOX
44y Male    Meds:  cefTRIAXone   IVPB 1000 milliGRAM(s) IV Intermittent every 24 hours  vancomycin  IVPB 1000 milliGRAM(s) IV Intermittent every 12 hours    Allergies    No Known Allergies    Intolerances        VITALS:  Vital Signs Last 24 Hrs  T(C): 36.7 (24 Jun 2025 12:41), Max: 37.2 (23 Jun 2025 20:50)  T(F): 98.1 (24 Jun 2025 12:41), Max: 99 (23 Jun 2025 20:50)  HR: 62 (24 Jun 2025 12:41) (54 - 62)  BP: 93/63 (24 Jun 2025 12:41) (93/63 - 105/65)  BP(mean): 73 (24 Jun 2025 12:41) (73 - 73)  RR: 16 (24 Jun 2025 12:41) (16 - 18)  SpO2: 96% (24 Jun 2025 12:41) (95% - 96%)    Parameters below as of 24 Jun 2025 12:41  Patient On (Oxygen Delivery Method): room air        LABS/DIAGNOSTIC TESTS:                          12.6   14.07 )-----------( 481      ( 24 Jun 2025 06:12 )             37.3         06-24    134[L]  |  100  |  15  ----------------------------<  85  4.4   |  33[H]  |  0.88    Ca    9.1      24 Jun 2025 06:12            CULTURES: Abscess groin  06-20 @ 18:30   Moderate Staphylococcus aureus  Rare Proteus mirabilis  --  Staphylococcus aureus  Proteus mirabilis      Blood Blood-Peripheral  06-20 @ 16:37   No growth at 72 Hours  --  --            RADIOLOGY:      ROS:  [  ] UNABLE TO ELICIT 44y Male who is doing well , he still has some right groin pain and scrotal pain but is decreasing as is his scrotal swelling, he has no fevers or chills, no diarrhea or other complaints. His cultures show that he is growing out MSSA and proteus that is sensitive to most antibiotics. Hence will DC his Vancomycin.    Meds:  cefTRIAXone   IVPB 1000 milliGRAM(s) IV Intermittent every 24 hours  vancomycin  IVPB 1000 milliGRAM(s) IV Intermittent every 12 hours    Allergies    No Known Allergies    Intolerances        VITALS:  Vital Signs Last 24 Hrs  T(C): 36.7 (24 Jun 2025 12:41), Max: 37.2 (23 Jun 2025 20:50)  T(F): 98.1 (24 Jun 2025 12:41), Max: 99 (23 Jun 2025 20:50)  HR: 62 (24 Jun 2025 12:41) (54 - 62)  BP: 93/63 (24 Jun 2025 12:41) (93/63 - 105/65)  BP(mean): 73 (24 Jun 2025 12:41) (73 - 73)  RR: 16 (24 Jun 2025 12:41) (16 - 18)  SpO2: 96% (24 Jun 2025 12:41) (95% - 96%)    Parameters below as of 24 Jun 2025 12:41  Patient On (Oxygen Delivery Method): room air        LABS/DIAGNOSTIC TESTS:                          12.6   14.07 )-----------( 481      ( 24 Jun 2025 06:12 )             37.3         06-24    134[L]  |  100  |  15  ----------------------------<  85  4.4   |  33[H]  |  0.88    Ca    9.1      24 Jun 2025 06:12            CULTURES: Abscess groin  06-20 @ 18:30   Moderate Staphylococcus aureus  Rare Proteus mirabilis  --  Staphylococcus aureus  Proteus mirabilis      Blood Blood-Peripheral  06-20 @ 16:37   No growth at 72 Hours  --  --            RADIOLOGY:      ROS:  [  ] UNABLE TO ELICIT

## 2025-06-25 ENCOUNTER — TRANSCRIPTION ENCOUNTER (OUTPATIENT)
Age: 45
End: 2025-06-25

## 2025-06-25 VITALS
HEART RATE: 65 BPM | OXYGEN SATURATION: 96 % | SYSTOLIC BLOOD PRESSURE: 130 MMHG | TEMPERATURE: 97 F | DIASTOLIC BLOOD PRESSURE: 75 MMHG | RESPIRATION RATE: 16 BRPM

## 2025-06-25 DIAGNOSIS — R19.00 INTRA-ABDOMINAL AND PELVIC SWELLING, MASS AND LUMP, UNSPECIFIED SITE: ICD-10-CM

## 2025-06-25 LAB
CULTURE RESULTS: ABNORMAL
CULTURE RESULTS: SIGNIFICANT CHANGE UP
CULTURE RESULTS: SIGNIFICANT CHANGE UP
ORGANISM # SPEC MICROSCOPIC CNT: ABNORMAL
SPECIMEN SOURCE: SIGNIFICANT CHANGE UP

## 2025-06-25 PROCEDURE — 87040 BLOOD CULTURE FOR BACTERIA: CPT

## 2025-06-25 PROCEDURE — 85027 COMPLETE CBC AUTOMATED: CPT

## 2025-06-25 PROCEDURE — 96374 THER/PROPH/DIAG INJ IV PUSH: CPT

## 2025-06-25 PROCEDURE — 36415 COLL VENOUS BLD VENIPUNCTURE: CPT

## 2025-06-25 PROCEDURE — 80048 BASIC METABOLIC PNL TOTAL CA: CPT

## 2025-06-25 PROCEDURE — 85730 THROMBOPLASTIN TIME PARTIAL: CPT

## 2025-06-25 PROCEDURE — 96372 THER/PROPH/DIAG INJ SC/IM: CPT

## 2025-06-25 PROCEDURE — 87205 SMEAR GRAM STAIN: CPT

## 2025-06-25 PROCEDURE — 83605 ASSAY OF LACTIC ACID: CPT

## 2025-06-25 PROCEDURE — 87070 CULTURE OTHR SPECIMN AEROBIC: CPT

## 2025-06-25 PROCEDURE — 87077 CULTURE AEROBIC IDENTIFY: CPT

## 2025-06-25 PROCEDURE — 80202 ASSAY OF VANCOMYCIN: CPT

## 2025-06-25 PROCEDURE — 87186 SC STD MICRODIL/AGAR DIL: CPT

## 2025-06-25 PROCEDURE — 96375 TX/PRO/DX INJ NEW DRUG ADDON: CPT

## 2025-06-25 PROCEDURE — 85610 PROTHROMBIN TIME: CPT

## 2025-06-25 PROCEDURE — 85025 COMPLETE CBC W/AUTO DIFF WBC: CPT

## 2025-06-25 PROCEDURE — 99285 EMERGENCY DEPT VISIT HI MDM: CPT

## 2025-06-25 PROCEDURE — 80053 COMPREHEN METABOLIC PANEL: CPT

## 2025-06-25 PROCEDURE — 74177 CT ABD & PELVIS W/CONTRAST: CPT

## 2025-06-25 RX ORDER — CEFUROXIME SODIUM 1.5 G
1 VIAL (EA) INJECTION
Qty: 20 | Refills: 0
Start: 2025-06-25 | End: 2025-07-04

## 2025-06-25 RX ADMIN — Medication 400 MILLIGRAM(S): at 09:01

## 2025-06-25 RX ADMIN — CEFTRIAXONE 100 MILLIGRAM(S): 500 INJECTION, POWDER, FOR SOLUTION INTRAMUSCULAR; INTRAVENOUS at 11:39

## 2025-06-25 RX ADMIN — HEPARIN SODIUM 5000 UNIT(S): 1000 INJECTION INTRAVENOUS; SUBCUTANEOUS at 05:29

## 2025-06-25 RX ADMIN — Medication 40 MILLIGRAM(S): at 05:31

## 2025-06-25 RX ADMIN — HEPARIN SODIUM 5000 UNIT(S): 1000 INJECTION INTRAVENOUS; SUBCUTANEOUS at 13:25

## 2025-06-25 RX ADMIN — Medication 1000 MILLIGRAM(S): at 04:00

## 2025-06-25 RX ADMIN — Medication 40 MILLIGRAM(S): at 11:35

## 2025-06-25 RX ADMIN — Medication 400 MILLIGRAM(S): at 03:20

## 2025-06-25 RX ADMIN — Medication 1000 MILLIGRAM(S): at 09:31

## 2025-06-25 NOTE — DISCHARGE NOTE NURSING/CASE MANAGEMENT/SOCIAL WORK - NSDCPEWEB_GEN_ALL_CORE
[facility] for Tobacco Control website --- [email or web address] website --- [email or web address]

## 2025-06-25 NOTE — PROGRESS NOTE ADULT - ASSESSMENT
44y.o. Male s/p RIH repair 6/11/25 now s/p I&D R groin abscess    Plan   - daily dressing /packing change, irrigate prn    - Scrotal elevation  - IV antibiotics, follow ID recommendations   - OOB ambulate  - Pain control prn  - DVT ppx  - discharge planning
Continue present regimen.
44y.o. Male s/p I&D R groin abscess s/p RIH repair 6/11/25    -Wound irrigated with saline  -Pack wound daily  -Scrotal elevation  -con't abx  -ID consult  -Reg diet   -OOB ambulate  -Pain control prn  -DVT ppx  -Incentive spirometry 
44y.o. Male s/p I&D R groin abscess s/p RIH repair 6/11/25    -Wound irrigated with saline  -Pack wound daily  -Scrotal elevation  -con't abx  -ID f/u  -Reg diet   -OOB ambulate  -Pain control prn  -DVT ppx  -Incentive spirometry   
Medical consult  ID consult
44y.o. Male s/p RIH repair 6/11/25 now s/p I&D R groin abscess  VSS, afebrile, WBC 14 (from 11)      Plan   - daily dressing /packing change, irrigate prn    - Scrotal elevation  - IV antibiotics, follow ID recommendations   - OOB ambulate  - Pain control prn  - DVT ppx  
Post op wound infection / groin abscess - improved  Cellulitis of lower abdomen/ groin/ scrotum and upper right thigh - all improved  Leukocytosis - increased a little      Plan -DC Vancomycin   Cont  Rocephin 1 gm iv q24hrs  Can DC home tomorrow on Ceftin 500mgs po BID x 10 days more  reconsult prn.

## 2025-06-25 NOTE — DISCHARGE NOTE NURSING/CASE MANAGEMENT/SOCIAL WORK - HAS THE PATIENT USED TOBACCO IN THE PAST 30 DAYS?
Requesting 90 day supply of Gabapentin 400 mg. 1 PO TID. E-scribe requesting refill for Gabapentin. Please review and e-scribe if applicable.      Last Visit Date: 12/8/2022    Next Visit Date:  Future Appointments   Date Time Provider Nicola Zaidi   12/20/2022 11:00 AM MELVIN Callahan [facility] OT [address] [place] [address]   12/21/2022 11:00 AM MELVIN Callahan [facility] OT [address] [place] [address]   12/23/2022  1:15 PM MD Annalee [facility]   12/23/2022  1:45 PM MELVIN Barrow [facility] OT [address] [place] [address]   12/28/2022  1:45 PM MELVIN Callahan [facility] OT [address] [place] [address]   12/29/2022  2:30 PM Sandy Marcus OT [facility] OT [address] [place] [address]   12/30/2022  2:00 PM MELVIN Barrow [facility] OT [address] [place] [address]   1/20/2023 10:30 AM DO Artemio Mccann Neuro Maisha Driscoll   2/15/2023  3:30 PM Omkar Goldberg MD Neuro [facility] Neurology -
Yes

## 2025-06-25 NOTE — PROGRESS NOTE ADULT - SUBJECTIVE AND OBJECTIVE BOX
INTERVAL HPI/OVERNIGHT EVENTS: NAEO. AVSS. Patient seen and examined at bedside. Patient denies pain, chills, fever, NVD. Patient states when he ambulates, he notices more drainage from the RLQ surgical incision site. Patient's dressing was changed without complication. Patient taught dressing changes and reminded the importance of changing it daily himself at home.    Vital Signs Last 24 Hrs  T(C): 36.6 (25 Jun 2025 05:19), Max: 36.7 (24 Jun 2025 12:41)  T(F): 97.8 (25 Jun 2025 05:19), Max: 98.1 (24 Jun 2025 12:41)  HR: 56 (25 Jun 2025 05:19) (56 - 71)  BP: 112/68 (25 Jun 2025 05:19) (93/63 - 112/68)  BP(mean): 70 (24 Jun 2025 20:20) (70 - 73)  RR: 16 (25 Jun 2025 05:19) (16 - 16)  SpO2: 99% (25 Jun 2025 05:19) (96% - 99%)    Parameters below as of 25 Jun 2025 05:19  Patient On (Oxygen Delivery Method): room air      I&O's Detail    24 Jun 2025 07:01  -  25 Jun 2025 07:00  --------------------------------------------------------  IN:  Total IN: 0 mL    OUT:    Voided (mL): 1250 mL  Total OUT: 1250 mL    Total NET: -1250 mL        cefTRIAXone   IVPB 1000 milliGRAM(s) IV Intermittent every 24 hours  pantoprazole    Tablet 40 milliGRAM(s) Oral before breakfast      Physical Exam:  General: AAOx3, No acute distress  HEENT: NC/AT, trachea midline  Respiratory: Nonlabored breathing, equal chest rise b/l   Skin: No jaundice, no icterus  Abdomen: soft,  nondistended, nontender, no rebound tenderness, no guarding, no palpable masses; dressing changed WTD, mild expression of drainage with palpation  Extremities: non edematous, no calf pain bilaterally      06-24-25 @ 07:01  -  06-25-25 @ 07:00  --------------------------------------------------------  IN: 0 mL / OUT: 1250 mL / NET: -1250 mL        Labs:                        12.6   14.07 )-----------( 481      ( 24 Jun 2025 06:12 )             37.3     06-24    134[L]  |  100  |  15  ----------------------------<  85  4.4   |  33[H]  |  0.88    Ca    9.1      24 Jun 2025 06:12          RADIOLOGY & ADDITIONAL STUDIES:

## 2025-06-25 NOTE — PROGRESS NOTE ADULT - PROBLEM SELECTOR PLAN 1
cultures noted  antibiotics  ID Follow up  Vanco peak and trough cultures noted  antibiotics  ID Follow up  Vanco peak and trough  Wound Care  Surgical follow up

## 2025-06-25 NOTE — DISCHARGE NOTE PROVIDER - NSDCMRMEDTOKEN_GEN_ALL_CORE_FT
cefuroxime 500 mg oral tablet: 1 tab(s) orally 2 times a day  ketorolac 10 mg oral tablet: 1 tab(s) orally every 6 hours as needed for  severe pain Be sure to take with food.  pantoprazole 40 mg oral delayed release tablet: 1 tab(s) orally once a day

## 2025-06-25 NOTE — PROGRESS NOTE ADULT - SUBJECTIVE AND OBJECTIVE BOX
pt seen in icu [  ], reg med floor [  x ], bed [ x ], chair at bedside [   ]  Awake, alert, comfortable in NAD.    REVIEW OF SYSTEMS:    CONSTITUTIONAL: No weakness, fevers or chills  EYES/ENT: No visual changes;  No vertigo or throat pain   NECK: No pain or stiffness  RESPIRATORY: No cough, wheezing, hemoptysis; No shortness of breath  CARDIOVASCULAR: No chest pain or palpitations  GASTROINTESTINAL: No abdominal or epigastric pain. No nausea, vomiting, or hematemesis; No diarrhea or constipation. No melena or hematochezia.  GENITOURINARY: No dysuria, frequency or hematuria  NEUROLOGICAL: No numbness or weakness  SKIN: No itching, burning, rashes, or lesions   All other review of systems is negative unless indicated above.    Physical Exam    General: WN/WD NAD  Neurology: A&Ox3, nonfocal, GREEN x 4  Respiratory: CTA B/L  CV: RRR, S1S2, no murmurs, rubs or gallops  Abdominal: Soft, NT, ND +BS, Last BM  Extremities: No edema, + peripheral pulses      Allergies  No Known Allergies      Health Issues  Intra-abdominal and pelvic swelling of mass or lump    HTN (hypertension)    Heroin abuse    Bipolar affective disorder        Vitals  T(F): 97.8 (06-25-25 @ 05:19), Max: 98.1 (06-24-25 @ 12:41)  HR: 56 (06-25-25 @ 05:19) (56 - 71)  BP: 112/68 (06-25-25 @ 05:19) (93/63 - 112/68)  RR: 16 (06-25-25 @ 05:19) (16 - 16)  SpO2: 99% (06-25-25 @ 05:19) (96% - 99%)  Wt(kg): --  CAPILLARY BLOOD GLUCOSE          Labs                          12.6   14.07 )-----------( 481      ( 24 Jun 2025 06:12 )             37.3       06-24    134[L]  |  100  |  15  ----------------------------<  85  4.4   |  33[H]  |  0.88    Ca    9.1      24 Jun 2025 06:12        Radiology Results  < from: CT Abdomen and Pelvis w/ IV Cont (06.20.25 @ 18:00) >  IMPRESSION: Extensive soft tissue infiltration and skin thickening of the   lower abdominal wall extending into the groin and scrotum. Please   correlate for cellulitis.    Focal soft tissue defect in the lower abdominal wall.    Large right hydrocele.        < end of copied text >      Meds    MEDICATIONS  (STANDING):  cefTRIAXone   IVPB 1000 milliGRAM(s) IV Intermittent every 24 hours  heparin   Injectable 5000 Unit(s) SubCutaneous every 8 hours  melatonin 5 milliGRAM(s) Oral at bedtime  methadone    Tablet 40 milliGRAM(s) Oral daily  pantoprazole    Tablet 40 milliGRAM(s) Oral before breakfast      MEDICATIONS  (PRN):  acetaminophen   IVPB .. 1000 milliGRAM(s) IV Intermittent every 6 hours PRN Temp greater or equal to 38C (100.4F), Mild Pain (1 - 3)  ondansetron Injectable 4 milliGRAM(s) IV Push every 6 hours PRN Nausea

## 2025-06-25 NOTE — DISCHARGE NOTE NURSING/CASE MANAGEMENT/SOCIAL WORK - FINANCIAL ASSISTANCE
[facility] provides services at a reduced cost to those who are determined to be eligible through [facility]’s financial assistance program. Information regarding [facility]’s financial assistance program can be found by going to [email or web address] or by calling [phone].

## 2025-06-25 NOTE — DISCHARGE NOTE PROVIDER - NSDCCPCAREPLAN_GEN_ALL_CORE_FT
PRINCIPAL DISCHARGE DIAGNOSIS  Diagnosis: Abscess of right groin  Assessment and Plan of Treatment: Please follow-up with your surgeon in 2 weeks. Drink plenty of fluids and rest as needed. Call for any fever over 101, nausea, vomiting, severe pain, no passing of gas or bowel movement.   DIET: You may resume your regular diet as normal.   SURGICAL INCISION SITES  Replace with wet gauze, cover with dry gauze and tape on your lower abdomen. Please keep it clean. Change dressing daily. If you notice any signs of surgical site infection (ie. redness, swelling, pain, pus drainage), please seek medical care immediately.   ACTIVITY  : Do not lift anything heavier than 10 pounds for 2 weeks and avoid strenuous activity for 4-6 weeks.   MEDICATIONS  Please take the full course of antibiotics as prescribed.        SECONDARY DISCHARGE DIAGNOSES  Diagnosis: Pelvic swelling  Assessment and Plan of Treatment:

## 2025-06-25 NOTE — DISCHARGE NOTE NURSING/CASE MANAGEMENT/SOCIAL WORK - NSDCPEEMAIL_GEN_ALL_CORE
[facility] for Tobacco Control email [email or web address]

## 2025-06-25 NOTE — DISCHARGE NOTE NURSING/CASE MANAGEMENT/SOCIAL WORK - NSDCPEFALRISK_GEN_ALL_CORE
For information on Fall & Injury Prevention, visit: [email or web address] OR  [email or web address] OR  https://www.cdc.gov/steadi/patient.html

## 2025-06-25 NOTE — DISCHARGE NOTE PROVIDER - HOSPITAL COURSE
44 yr old male with hx of heroin use, HTN presented to ED 6/20 c/o right scrotal swelling, redness and pain x 4 days. Patient had hernia repair with Dr. Pichardo 6/11/25. Patient states everything was ok until recently. Bedside I&D was performed and patient was put on antibiotics. Patient this morning denies NVD, pain, chills, fever. Patient is safe and stable for discharge at this time.

## 2025-06-25 NOTE — PROGRESS NOTE ADULT - PROVIDER SPECIALTY LIST ADULT
Infectious Disease
Surgery
Internal Medicine
Pulmonology
Internal Medicine
Internal Medicine

## 2025-06-25 NOTE — DISCHARGE NOTE PROVIDER - NSDCFUSCHEDAPPT_GEN_ALL_CORE_FT
Cale Pichardo  [facility]  [facility] [address] [facility]  Scheduled Appointment: 06/26/2025     Cale Pichardo  [facility]  [facility] [address] [facility]  Scheduled Appointment: 07/03/2025

## 2025-06-25 NOTE — DISCHARGE NOTE NURSING/CASE MANAGEMENT/SOCIAL WORK - PATIENT PORTAL LINK FT
You can access the FollowMyHealth Patient Portal offered by [facility] by registering at the following website: [email or web address]. By joining [facility]’s FollowMyHealth portal, you will also be able to view your health information using other applications (apps) compatible with our system.

## 2025-06-25 NOTE — DISCHARGE NOTE PROVIDER - CARE PROVIDER_API CALL
Cale Pichardo  Surgery (General Surgery)  [address]  [place] [address]  Phone: [phone]  Fax: [phone]  Follow Up Time:

## 2025-06-25 NOTE — PROGRESS NOTE ADULT - PROBLEM SELECTOR PLAN 4
watch for withdrawal symptoms  methadone 40 mgs po Daily  pain management eval

## 2025-06-26 ENCOUNTER — APPOINTMENT (OUTPATIENT)
Dept: SURGERY | Facility: CLINIC | Age: 45
End: 2025-06-26

## 2025-06-26 RX ORDER — KETOROLAC TROMETHAMINE 30 MG/ML
1 INJECTION, SOLUTION INTRAMUSCULAR; INTRAVENOUS
Qty: 20 | Refills: 0
Start: 2025-06-26 | End: 2025-06-30

## 2025-06-26 RX ORDER — CEFUROXIME SODIUM 1.5 G
1 VIAL (EA) INJECTION
Qty: 20 | Refills: 0
Start: 2025-06-26 | End: 2025-07-05

## 2025-07-10 ENCOUNTER — APPOINTMENT (OUTPATIENT)
Dept: SURGERY | Facility: CLINIC | Age: 45
End: 2025-07-10
Payer: MEDICAID

## 2025-07-10 PROCEDURE — 99024 POSTOP FOLLOW-UP VISIT: CPT

## (undated) DEVICE — GLV 7 PROTEXIS (WHITE)

## (undated) DEVICE — NDL HYPO SAFE 25G X 1.5" (ORANGE)

## (undated) DEVICE — SOL IRR POUR NS 0.9% 1500ML

## (undated) DEVICE — DRSG MASTISOL

## (undated) DEVICE — PACK MINOR NO DRAPE

## (undated) DEVICE — SUT POLYSORB 2-0 18" TIES UNDYED

## (undated) DEVICE — DRAPE LAPAROTOMY W POUCHES

## (undated) DEVICE — SUT POLYSORB 2-0 30" V-20 UNDYED

## (undated) DEVICE — SUT POLYSORB 3-0 30" V-20 UNDYED

## (undated) DEVICE — GLV 7.5 PROTEXIS (WHITE)

## (undated) DEVICE — DRSG CURITY GAUZE SPONGE 4 X 4" 12-PLY

## (undated) DEVICE — SUT POLYSORB 4-0 27" P-12 UNDYED

## (undated) DEVICE — DRAPE LIGHT HANDLE COVER (BLUE)

## (undated) DEVICE — ELCTR GROUNDING PAD ADULT COVIDIEN

## (undated) DEVICE — FOR-ESU VALLEYLAB T7E14999DX: Type: DURABLE MEDICAL EQUIPMENT

## (undated) DEVICE — VENODYNE/SCD SLEEVE CALF MEDIUM

## (undated) DEVICE — WARMING BLANKET UPPER ADULT

## (undated) DEVICE — DRAIN PENROSE 5/8" X 18" LATEX

## (undated) DEVICE — DRSG TEGADERM 4 X 4.75"

## (undated) DEVICE — SUT SURGIPRO 2-0 30" GS-22

## (undated) DEVICE — PREP CHLORAPREP HI-LITE ORANGE 26ML